# Patient Record
Sex: FEMALE | HISPANIC OR LATINO | Employment: FULL TIME | URBAN - METROPOLITAN AREA
[De-identification: names, ages, dates, MRNs, and addresses within clinical notes are randomized per-mention and may not be internally consistent; named-entity substitution may affect disease eponyms.]

---

## 2018-02-15 ENCOUNTER — HOSPITAL ENCOUNTER (OUTPATIENT)
Dept: RADIOLOGY | Facility: MEDICAL CENTER | Age: 58
End: 2018-02-15
Attending: FAMILY MEDICINE
Payer: COMMERCIAL

## 2018-02-15 DIAGNOSIS — R10.31 ABDOMINAL PAIN, RIGHT LOWER QUADRANT: ICD-10-CM

## 2018-02-15 PROCEDURE — 76830 TRANSVAGINAL US NON-OB: CPT

## 2018-02-15 PROCEDURE — 76700 US EXAM ABDOM COMPLETE: CPT

## 2018-02-20 ENCOUNTER — HOSPITAL ENCOUNTER (OUTPATIENT)
Dept: HOSPITAL 8 - CFH | Age: 58
Discharge: HOME | End: 2018-02-20
Attending: FAMILY MEDICINE
Payer: COMMERCIAL

## 2018-02-20 DIAGNOSIS — Z12.31: Primary | ICD-10-CM

## 2018-02-20 PROCEDURE — 77063 BREAST TOMOSYNTHESIS BI: CPT

## 2020-10-24 ENCOUNTER — OFFICE VISIT (OUTPATIENT)
Dept: URGENT CARE | Facility: PHYSICIAN GROUP | Age: 60
End: 2020-10-24
Payer: COMMERCIAL

## 2020-10-24 ENCOUNTER — HOSPITAL ENCOUNTER (OUTPATIENT)
Facility: MEDICAL CENTER | Age: 60
End: 2020-10-24
Attending: PHYSICIAN ASSISTANT
Payer: COMMERCIAL

## 2020-10-24 VITALS
OXYGEN SATURATION: 99 % | BODY MASS INDEX: 25.83 KG/M2 | DIASTOLIC BLOOD PRESSURE: 80 MMHG | HEIGHT: 65 IN | WEIGHT: 155 LBS | SYSTOLIC BLOOD PRESSURE: 132 MMHG | TEMPERATURE: 97.6 F | HEART RATE: 75 BPM | RESPIRATION RATE: 14 BRPM

## 2020-10-24 DIAGNOSIS — R05.9 COUGH: ICD-10-CM

## 2020-10-24 DIAGNOSIS — Z20.822 EXPOSURE TO COVID-19 VIRUS: ICD-10-CM

## 2020-10-24 PROCEDURE — U0003 INFECTIOUS AGENT DETECTION BY NUCLEIC ACID (DNA OR RNA); SEVERE ACUTE RESPIRATORY SYNDROME CORONAVIRUS 2 (SARS-COV-2) (CORONAVIRUS DISEASE [COVID-19]), AMPLIFIED PROBE TECHNIQUE, MAKING USE OF HIGH THROUGHPUT TECHNOLOGIES AS DESCRIBED BY CMS-2020-01-R: HCPCS

## 2020-10-24 PROCEDURE — 99203 OFFICE O/P NEW LOW 30 MIN: CPT | Mod: CS | Performed by: PHYSICIAN ASSISTANT

## 2020-10-24 RX ORDER — ALBUTEROL SULFATE 90 UG/1
2 AEROSOL, METERED RESPIRATORY (INHALATION) EVERY 6 HOURS PRN
Qty: 8.5 G | Refills: 0 | Status: SHIPPED | OUTPATIENT
Start: 2020-10-24 | End: 2024-01-29

## 2020-10-24 SDOH — HEALTH STABILITY: MENTAL HEALTH: HOW OFTEN DO YOU HAVE A DRINK CONTAINING ALCOHOL?: NEVER

## 2020-10-24 ASSESSMENT — ENCOUNTER SYMPTOMS
SHORTNESS OF BREATH: 1
WHEEZING: 0
GASTROINTESTINAL NEGATIVE: 1
SINUS PAIN: 0
RHINORRHEA: 0
SORE THROAT: 0
DIZZINESS: 0
COUGH: 1
SPUTUM PRODUCTION: 0
MYALGIAS: 1
PALPITATIONS: 0
HEADACHES: 1
CHILLS: 1
FEVER: 1
CARDIOVASCULAR NEGATIVE: 1

## 2020-10-24 NOTE — PROGRESS NOTES
Subjective:      Amber Gamble is a 60 y.o. female who presents with Headache (x2 days. bodyaches, headache,  is positive for covid )            Cough, congestion, body aches.  Has been tested positive for Covid 2 days ago.  She denies chest pain, shortness of breath, wheezing, leg swelling.  No history of asthma, pneumonia, DVT/PE.  Loss of taste and smell noted.    Cough  This is a new problem. The current episode started in the past 7 days. The problem has been gradually worsening. The problem occurs every few minutes. The cough is productive of sputum. Associated symptoms include chills, ear pain, a fever, headaches, myalgias, nasal congestion and shortness of breath. Pertinent negatives include no chest pain, rhinorrhea, sore throat or wheezing. Treatments tried: Tylenol. The treatment provided mild relief. There is no history of asthma, bronchitis or pneumonia.       PMH:  has no past medical history on file.  MEDS: No current outpatient medications on file.  ALLERGIES: Not on File  SURGHX: No past surgical history on file.  SOCHX:  reports that she has never smoked. She has never used smokeless tobacco. She reports that she does not drink alcohol.  FH: family history is not on file.    Review of Systems   Constitutional: Positive for chills, fever and malaise/fatigue.   HENT: Positive for congestion and ear pain. Negative for rhinorrhea, sinus pain and sore throat.    Respiratory: Positive for cough and shortness of breath. Negative for sputum production and wheezing.    Cardiovascular: Negative.  Negative for chest pain, palpitations and leg swelling.   Gastrointestinal: Negative.    Genitourinary: Negative.    Musculoskeletal: Positive for myalgias. Negative for joint pain.   Neurological: Positive for headaches. Negative for dizziness.   All other systems reviewed and are negative.      Medications, Allergies, and current problem list reviewed today in Epic  Family history reviewed with patient and  "is not pertinent for today's visit     Objective:     /80   Pulse 75   Temp 36.4 °C (97.6 °F) (Temporal)   Resp 14   Ht 1.64 m (5' 4.57\")   Wt 70.3 kg (155 lb)   SpO2 99%   BMI 26.14 kg/m²      Physical Exam  Vitals signs and nursing note reviewed.   Constitutional:       General: She is not in acute distress.     Appearance: She is well-developed. She is not ill-appearing, toxic-appearing or diaphoretic.   HENT:      Head: Normocephalic and atraumatic.      Right Ear: Tympanic membrane, ear canal and external ear normal.      Left Ear: Tympanic membrane, ear canal and external ear normal.      Nose: Congestion present. No rhinorrhea.      Mouth/Throat:      Pharynx: No oropharyngeal exudate or posterior oropharyngeal erythema.   Eyes:      General:         Right eye: No discharge.         Left eye: No discharge.      Conjunctiva/sclera: Conjunctivae normal.   Neck:      Musculoskeletal: Normal range of motion and neck supple.   Cardiovascular:      Rate and Rhythm: Normal rate and regular rhythm.      Heart sounds: Normal heart sounds.   Pulmonary:      Effort: Pulmonary effort is normal. No respiratory distress.      Breath sounds: Normal breath sounds. No wheezing, rhonchi or rales.   Abdominal:      General: Abdomen is flat. There is no distension.      Tenderness: There is no abdominal tenderness. There is no right CVA tenderness, guarding or rebound.   Musculoskeletal: Normal range of motion.         General: No swelling or tenderness.      Right lower leg: No edema.      Left lower leg: No edema.   Lymphadenopathy:      Cervical: No cervical adenopathy.   Skin:     General: Skin is warm and dry.   Neurological:      Mental Status: She is alert and oriented to person, place, and time.   Psychiatric:         Behavior: Behavior normal.         Thought Content: Thought content normal.         Judgment: Judgment normal.                 Assessment/Plan:         1. Exposure to COVID-19 virus  albuterol " 108 (90 Base) MCG/ACT Aero Soln inhalation aerosol    COVID/SARS COV-2 PCR   2. Cough  albuterol 108 (90 Base) MCG/ACT Aero Soln inhalation aerosol    COVID/SARS COV-2 PCR     Covid type symptoms with positive exposure 2 days ago.  PO2 adequate at 99% vital signs normal.  No wheezes rhonchi or rales heard on exam.  No chest pain, palpitations, shortness of breath or lower leg swelling/tenderness seen on exam.  No history of asthma, pneumonia, DVT/PE.  Albuterol for breakthrough shortness of breath.  Covid testing initiated.  Quarantine per CDC guidelines.  Note for work provided.  OTC meds and conservative measures as discussed    Return to clinic or go to ED if symptoms worsen or persist. Indications for ED discussed at length. Patient voices understanding. Follow-up with your primary care provider in 3-5 days. Red flags discussed. All side effects of medication discussed including allergic response, GI upset, tendon injury, etc.    Please note that this dictation was created using voice recognition software. I have made every reasonable attempt to correct obvious errors, but I expect that there are errors of grammar and possibly content that I did not discover before finalizing the note.

## 2020-10-24 NOTE — LETTER
October 24, 2020      Amber Gamble  [address] [facility] [place] [address]    To Whom it May Concern,       Your employee was seen in our clinic today.  A concern for COVID-19 has been identified and testing is in progress.?       We are asking you to excuse absences while following self-isolation protocol per Center for Disease Control (CDC) guidelines.  Your employee will be able to access test results through our electronic delivery system called [employer].?       If the results of testing are negative, and once there has been no fever (temperature >100.4 F) for at least 72 hours without treatment, and no vomiting or diarrhea for at least 48 hours, then return to work is approved.       If the results of testing are positive then your employee will be contacted by the [facility] or [facility] department for further instructions on duration of self-isolation and return to work protocol. In general, this will also follow the CDC guidelines with a minimum of 10 days from the onset of symptoms and without fever, vomiting, or diarrhea as above.?       In general, repeat testing is not necessary and not offered through our [facility] care.?       This is the only note that will be provided from [facility] for this visit.  Your employee will require an appointment with a primary care provider if FMLA or disability forms are required.     Sincerely,?        Kevin Jules P.A.-C.    Electronically Signed

## 2020-10-25 DIAGNOSIS — Z20.822 EXPOSURE TO COVID-19 VIRUS: ICD-10-CM

## 2020-10-25 DIAGNOSIS — R05.9 COUGH: ICD-10-CM

## 2020-10-25 LAB
COVID ORDER STATUS COVID19: NORMAL
SARS-COV-2 RNA RESP QL NAA+PROBE: NOTDETECTED
SPECIMEN SOURCE: NORMAL

## 2020-10-28 ENCOUNTER — OFFICE VISIT (OUTPATIENT)
Dept: URGENT CARE | Facility: PHYSICIAN GROUP | Age: 60
End: 2020-10-28
Payer: COMMERCIAL

## 2020-10-28 ENCOUNTER — HOSPITAL ENCOUNTER (OUTPATIENT)
Facility: MEDICAL CENTER | Age: 60
End: 2020-10-28
Attending: NURSE PRACTITIONER
Payer: COMMERCIAL

## 2020-10-28 VITALS
RESPIRATION RATE: 14 BRPM | SYSTOLIC BLOOD PRESSURE: 132 MMHG | HEART RATE: 95 BPM | TEMPERATURE: 98.6 F | BODY MASS INDEX: 26.29 KG/M2 | DIASTOLIC BLOOD PRESSURE: 70 MMHG | OXYGEN SATURATION: 98 % | WEIGHT: 154 LBS | HEIGHT: 64 IN

## 2020-10-28 DIAGNOSIS — R05.9 COUGH: ICD-10-CM

## 2020-10-28 DIAGNOSIS — Z20.822 EXPOSURE TO COVID-19 VIRUS: ICD-10-CM

## 2020-10-28 DIAGNOSIS — R68.83 CHILLS: ICD-10-CM

## 2020-10-28 DIAGNOSIS — R52 GENERALIZED BODY ACHES: ICD-10-CM

## 2020-10-28 DIAGNOSIS — B34.9 VIRAL ILLNESS: ICD-10-CM

## 2020-10-28 PROCEDURE — U0003 INFECTIOUS AGENT DETECTION BY NUCLEIC ACID (DNA OR RNA); SEVERE ACUTE RESPIRATORY SYNDROME CORONAVIRUS 2 (SARS-COV-2) (CORONAVIRUS DISEASE [COVID-19]), AMPLIFIED PROBE TECHNIQUE, MAKING USE OF HIGH THROUGHPUT TECHNOLOGIES AS DESCRIBED BY CMS-2020-01-R: HCPCS

## 2020-10-28 PROCEDURE — 99214 OFFICE O/P EST MOD 30 MIN: CPT | Mod: CS | Performed by: NURSE PRACTITIONER

## 2020-10-28 ASSESSMENT — ENCOUNTER SYMPTOMS
CHILLS: 1
SORE THROAT: 1
VOMITING: 1
MYALGIAS: 1
CARDIOVASCULAR NEGATIVE: 1
NAUSEA: 1
HEADACHES: 1
COUGH: 1
DIARRHEA: 1

## 2020-10-28 ASSESSMENT — VISUAL ACUITY: OU: 1

## 2020-10-28 NOTE — PROGRESS NOTES
"Subjective:     Amber Gamble is a 60 y.o. female who presents for Headache (x4 days, headache, nausea, diarrhea, emesis, chills, very cold, deep breath mild pain in throat and chest. bodyaches )    Kazakh translation provided by professional video conferencing service.        Headache   Associated symptoms include coughing, nausea, a sore throat and vomiting.     Patient complaining of persistent symptoms. Seen 4 days ago in . Tested negative for COVID-19. Patient concerned that she has it as her  tested positive.    Patient was screened prior to rooming and denied COVID-19 diagnosis or contact with a person who has been diagnosed or is suspected to have COVID-19. During this visit, appropriate PPE was worn, hand hygiene was performed, and the patient and any visitors were masked.     PMH:  has no past medical history on file.    MEDS:   Current Outpatient Medications:   •  Pseudoephedrine-Ibuprofen (ADVIL COLD/SINUS PO), Take  by mouth., Disp: , Rfl:   •  albuterol 108 (90 Base) MCG/ACT Aero Soln inhalation aerosol, Inhale 2 Puffs by mouth every 6 hours as needed for Shortness of Breath. (Patient not taking: Reported on 10/28/2020), Disp: 8.5 g, Rfl: 0    ALLERGIES: Not on File    SURGHX: No past surgical history on file.    SOCHX:  reports that she has never smoked. She has never used smokeless tobacco. She reports that she does not drink alcohol.     FH: Reviewed with patient, not pertinent to this visit.    Review of Systems   Constitutional: Positive for chills.   HENT: Positive for sore throat.    Respiratory: Positive for cough.    Cardiovascular: Negative.    Gastrointestinal: Positive for diarrhea, nausea and vomiting.   Musculoskeletal: Positive for myalgias.   Neurological: Positive for headaches.   All other systems reviewed and are negative.    Additional details per HPI.      Objective:     /70   Pulse 95   Temp 37 °C (98.6 °F) (Temporal)   Resp 14   Ht 1.626 m (5' 4\")   Wt 69.9 " kg (154 lb)   SpO2 98%   BMI 26.43 kg/m²     Physical Exam  Vitals signs reviewed.   Constitutional:       General: She is not in acute distress.     Appearance: She is well-developed. She is not ill-appearing or toxic-appearing.   HENT:      Head: Normocephalic.      Right Ear: External ear normal.      Left Ear: External ear normal.      Nose: Nose normal.      Mouth/Throat:      Mouth: Mucous membranes are moist.      Pharynx: Oropharynx is clear. Uvula midline.   Eyes:      General: Vision grossly intact.      Extraocular Movements: Extraocular movements intact.      Conjunctiva/sclera: Conjunctivae normal.      Pupils: Pupils are equal, round, and reactive to light.   Neck:      Musculoskeletal: Normal range of motion.   Cardiovascular:      Rate and Rhythm: Normal rate and regular rhythm.      Heart sounds: Normal heart sounds.   Pulmonary:      Effort: Pulmonary effort is normal. No respiratory distress.      Breath sounds: Normal breath sounds. No decreased breath sounds, wheezing, rhonchi or rales.   Abdominal:      General: Bowel sounds are normal.      Palpations: Abdomen is soft.   Musculoskeletal: Normal range of motion.         General: No deformity.   Skin:     General: Skin is warm and dry.      Coloration: Skin is not pale.   Neurological:      Mental Status: She is alert and oriented to person, place, and time.      Sensory: No sensory deficit.      Motor: No weakness.      Coordination: Coordination normal.   Psychiatric:         Behavior: Behavior normal. Behavior is cooperative.       Assessment/Plan:     1. Exposure to COVID-19 virus    2. Chills    3. Generalized body aches    4. Cough    5. Viral illness  - COVID/SARS CoV-2 PCR; Future    Discussed likely self-limiting viral etiology and expected course and duration of illness. Vital signs stable, afebrile, no acute distress at this time.     COVID test pending. The CDC recommends that any person who has symptoms of COVID-19 self-isolates  until 1) at least 10 days have elapsed since symptom onset, and 2) at least 24 hours have passed since resolution of any fever without use of fever-reducing medications, and 3) other symptoms have improved. According to the CDC, the patient may leave self-isolation once all of these criteria have been met.    Differential diagnosis, natural history, supportive care, rest, fluids, over-the-counter symptom management per 's instructions, APAP, close monitoring, and indications for immediate follow-up discussed.     Warning signs reviewed. Return precautions discussed.     Patient advised to: Return for 1) Symptoms that worsen/don't improve, or go to ER, 2) Follow up with primary care in 7-10 days.    All questions answered. Patient agrees with the plan of care.    Discharge summary provided.     Billing note for MDM: at least a moderate risk due to 1) undiagnosed new problem, and problem points due to 2) new, further work up planned (COVID-19 test pending to further evaluate viral illness).

## 2020-10-28 NOTE — PATIENT INSTRUCTIONS
COVID test pending. The CDC recommends that any person who has symptoms of COVID-19 self-isolates until 1) at least 10 days have elapsed since symptom onset, and 2) at least 24 hours have passed since resolution of any fever without use of fever-reducing medications, and 3) other symptoms have improved. According to the CDC, the patient may leave self-isolation once all of these criteria have been met.       Enfermedades virales en los adultos  (Viral Illness, Adult)  Los virus son microbios diminutos que entran en el organismo de jake persona y causan enfermedades. Hay muchos tipos de virus diferentes y causan muchas clases de enfermedades. Las enfermedades virales pueden ser leves o graves. Pueden afectar diferentes partes del cuerpo.  Las enfermedades frecuentes causadas por virus incluyen los resfríos y la gripe. Además, las enfermedades virales abarcan fritz clínicos graves, federico el VIH/sida (virus de inmunodeficiencia humana/síndrome de inmunodeficiencia adquirida). Se hilliard identificado unos pocos virus asociados con determinados tipos de cáncer.  ¿CUÁLES SON LAS CAUSAS?  Muchos tipos de virus pueden causar enfermedades. Los virus invaden las células del organismo, se multiplican y provocan la disfunción o la muerte de las células infectadas. Cuando la célula muere, libera más virus. Cuando esto ocurre, aparecen síntomas de la enfermedad, y el virus sigue diseminándose a otras células. Si el virus asume la función de la célula, puede hacer que esta se divida y crezca fuera de control, y vasile es el kitty en el que un virus causa cáncer.  Los diferentes virus ingresan al organismo de distintas formas. Puede contraer un virus de la siguiente manera:  · Al ingerir alimentos o beber agua contaminados con el virus.  · Al inhalar gotitas que jake persona infectada liberó en el aire al toser o estornudar.  · Al tocar jake superficie contaminada con el virus y luego llevarse la mano a la boca, la nariz o los ojos.  · Al  ser ty por un insecto o mordido por un animal que son portadores del virus.  · Al tener contacto sexual con jake persona infectada por el virus.  · Al tener contacto con desire o líquidos que contienen el virus, ya sea a través de un laney abierto o zulema jake transfusión.  Si el virus ingresa al organismo, el sistema de defensa del cuerpo (sistema inmunitario) intentará combatirlo. Puede correr un riesgo más alto de tener jake enfermedad viral si tiene el sistema inmunitario debilitado.  ¿CUÁLES SON LOS SIGNOS O LOS SÍNTOMAS?  Los síntomas varían en función del tipo de virus y de la ubicación de las células que vasile invade. Los síntomas frecuentes de los principales tipos de enfermedades virales incluyen los siguientes:  Virus del resfrío y de la gripe  · Fiebre.  · Dolor de jay.  · Dolor de garganta.  · Nimo musculares.  · Congestión nasal.  · Tos.  Virus del aparato digestivo (gastrointestinales)  · Fiebre.  · Dolor abdominal.  · Náuseas.  · Diarrea.  Virus hepáticos (hepatitis)  · Pérdida del apetito.  · Cansancio.  · Tank amarillento de la piel (ictericia).  Virus del cerebro y la médula bah  · Fiebre.  · Dolor de jay.  · Rigidez en el rosita.  · Náuseas y vómitos.  · Confusión o somnolencia.  Virus de la piel  · Verrugas.  · Picazón.  · Erupción cutánea.  Virus de transmisión sexual  · Secreción.  · Hinchazón.  · Enrojecimiento.  · Erupción cutánea.  ¿CÓMO SE TRATA ESTA AFECCIÓN?  Los virus pueden ser difíciles de tratar porque se hospedan en las células. Los antibióticos no tratan los virus porque no llegan al interior de las células. El tratamiento de jake enfermedad viral puede incluir lo siguiente:  · Descansar y beber abundantes líquidos.  · Medicamentos para aliviar los síntomas. Estos pueden incluir medicamentos de venta cricket para el dolor y la fiebre, medicamentos para la tos o la congestión y medicamentos para aliviar la diarrea.  · Medicamentos antivirales. Estos fármacos están  disponibles únicamente para determinados tipos de virus. Pueden ayudar a aliviar los síntomas de la gripe, si se nedra apenas comienza el cuadro. También hay antivirales para la hepatitis y el VIH/sida.  Algunas enfermedades virales pueden evitarse con vacunas. Un ejemplo frecuente es la vacuna antigripal.  SIGA ESTAS INDICACIONES EN CARDOZA CASA:  Medicamentos  · Whitten los medicamentos de venta cricket y los recetados solamente federico se lo haya indicado el médico.  · Si le recetaron un antiviral, tómelo federico se lo haya indicado el médico. No deje de aditya los medicamentos aunque comience a sentirse mejor.  · Infórmese sobre cuándo los antibióticos son necesarios y cuándo no. Los antibióticos no combaten a los virus. Si el médico leesa que usted puede tener jake infección bacteriana así federico jake viral, caro vez le receten un antibiótico.  ? No solicite jake receta de antibióticos si le hilliard diagnosticado jake enfermedad viral. Eso no hará que la enfermedad pase más rápidamente.  ? Aditya antibióticos con frecuencia cuando no son necesarios puede derivar en resistencia a los antibióticos. Cuando esto ocurre, el medicamento pierde cardoza eficacia contra las bacterias que normalmente combate.  Instrucciones generales  · Benita suficiente líquido para mantener la orina miguel o de color amarillo pálido.  · Descanse todo lo que pueda.  · Retome danni actividades normales federico se lo haya indicado el médico. Pregúntele al médico qué actividades son seguras para usted.  · Concurra a todas las visitas de control federico se lo haya indicado el médico. Odebolt es importante.  ¿CÓMO SE ROSI ESTO?  Whitten estas medidas para reducir el riesgo de tener jake infección viral:  · Consuma jake dieta rashid y descanse mucho.  · Lávese las lei frecuentemente con agua y jabón. Odebolt es especialmente importante cuando está en lugares públicos. Use desinfectante para lei si no dispone de agua y jabón.  · Evite el contacto cercano con amigos y familiares que tengan jake  infección viral.  · Si viaja a las regiones donde las infecciones virales gastrointestinales son frecuentes, no tome agua ni coma alimentos crudos.  · Manténgase al día con las vacunas. Vacúnese contra la gripe todos los años federico se lo haya indicado el médico.  · No comparta cepillos de dientes, cortaúñas, rasuradoras o agujas con otras personas.  · Siempre tenga sexo con protección.  COMUNÍQUESE CON UN MÉDICO SI:  · Tiene síntomas de jake enfermedad viral que no desaparecen.  · Los síntomas regresan después de branden desaparecido.  · Los síntomas empeoran.  SOLICITE AYUDA DE INMEDIATO SI:  · Tiene dificultad para respirar.  · Siente un dolor de jay intenso o rigidez en el rosita.  · Tiene vómitos emil o dolor abdominal.  Esta información no tiene federico fin reemplazar el consejo del médico. Asegúrese de hacerle al médico cualquier pregunta que tenga.  Document Released: 08/24/2017 Document Revised: 08/24/2017 Document Reviewed: 04/28/2017  Elsevier Patient Education © 2020 [employer]

## 2020-10-29 DIAGNOSIS — B34.9 VIRAL ILLNESS: ICD-10-CM

## 2020-10-29 LAB — COVID ORDER STATUS COVID19: NORMAL

## 2020-10-30 ENCOUNTER — TELEPHONE (OUTPATIENT)
Dept: URGENT CARE | Facility: PHYSICIAN GROUP | Age: 60
End: 2020-10-30

## 2020-10-30 LAB
SARS-COV-2 RNA RESP QL NAA+PROBE: DETECTED
SPECIMEN SOURCE: ABNORMAL

## 2020-10-30 NOTE — TELEPHONE ENCOUNTER
Spoke with patient over the phone regarding her SARS-CoV-2 PCR test results which came back positive.  Patient advised that she has COVID-19.    Patient advised that the health department will be in contact with her.  Patient advised to continue with self-isolation, supportive measures, and close monitoring with return precautions.    As requested, a letter with her test results has been forwarded to the  and she may pick this up at her earliest convenience.    All questions answered.

## 2020-11-03 ENCOUNTER — HOSPITAL ENCOUNTER (EMERGENCY)
Facility: MEDICAL CENTER | Age: 60
End: 2020-11-03
Attending: EMERGENCY MEDICINE
Payer: COMMERCIAL

## 2020-11-03 ENCOUNTER — APPOINTMENT (OUTPATIENT)
Dept: RADIOLOGY | Facility: MEDICAL CENTER | Age: 60
End: 2020-11-03
Attending: EMERGENCY MEDICINE
Payer: COMMERCIAL

## 2020-11-03 VITALS
WEIGHT: 155 LBS | BODY MASS INDEX: 26.46 KG/M2 | SYSTOLIC BLOOD PRESSURE: 122 MMHG | DIASTOLIC BLOOD PRESSURE: 74 MMHG | HEIGHT: 64 IN | OXYGEN SATURATION: 92 % | RESPIRATION RATE: 16 BRPM | HEART RATE: 95 BPM | TEMPERATURE: 99.9 F

## 2020-11-03 DIAGNOSIS — U07.1 COVID-19 VIRUS INFECTION: ICD-10-CM

## 2020-11-03 DIAGNOSIS — J12.9 VIRAL PNEUMONIA: ICD-10-CM

## 2020-11-03 LAB
ALBUMIN SERPL BCP-MCNC: 3.7 G/DL (ref 3.2–4.9)
ALBUMIN/GLOB SERPL: 1.2 G/DL
ALP SERPL-CCNC: 83 U/L (ref 30–99)
ALT SERPL-CCNC: 54 U/L (ref 2–50)
ANION GAP SERPL CALC-SCNC: 7 MMOL/L (ref 7–16)
AST SERPL-CCNC: 44 U/L (ref 12–45)
BASOPHILS # BLD AUTO: 0.2 % (ref 0–1.8)
BASOPHILS # BLD: 0.01 K/UL (ref 0–0.12)
BILIRUB SERPL-MCNC: 0.4 MG/DL (ref 0.1–1.5)
BUN SERPL-MCNC: 10 MG/DL (ref 8–22)
CALCIUM SERPL-MCNC: 8.7 MG/DL (ref 8.5–10.5)
CHLORIDE SERPL-SCNC: 96 MMOL/L (ref 96–112)
CO2 SERPL-SCNC: 27 MMOL/L (ref 20–33)
CREAT SERPL-MCNC: 0.59 MG/DL (ref 0.5–1.4)
EKG IMPRESSION: NORMAL
EOSINOPHIL # BLD AUTO: 0 K/UL (ref 0–0.51)
EOSINOPHIL NFR BLD: 0 % (ref 0–6.9)
ERYTHROCYTE [DISTWIDTH] IN BLOOD BY AUTOMATED COUNT: 41.1 FL (ref 35.9–50)
GLOBULIN SER CALC-MCNC: 3.2 G/DL (ref 1.9–3.5)
GLUCOSE SERPL-MCNC: 158 MG/DL (ref 65–99)
HCT VFR BLD AUTO: 42.2 % (ref 37–47)
HGB BLD-MCNC: 14.2 G/DL (ref 12–16)
IMM GRANULOCYTES # BLD AUTO: 0.02 K/UL (ref 0–0.11)
IMM GRANULOCYTES NFR BLD AUTO: 0.3 % (ref 0–0.9)
LYMPHOCYTES # BLD AUTO: 0.87 K/UL (ref 1–4.8)
LYMPHOCYTES NFR BLD: 13.1 % (ref 22–41)
MCH RBC QN AUTO: 30.1 PG (ref 27–33)
MCHC RBC AUTO-ENTMCNC: 33.6 G/DL (ref 33.6–35)
MCV RBC AUTO: 89.6 FL (ref 81.4–97.8)
MONOCYTES # BLD AUTO: 0.31 K/UL (ref 0–0.85)
MONOCYTES NFR BLD AUTO: 4.7 % (ref 0–13.4)
NEUTROPHILS # BLD AUTO: 5.43 K/UL (ref 2–7.15)
NEUTROPHILS NFR BLD: 81.7 % (ref 44–72)
NRBC # BLD AUTO: 0 K/UL
NRBC BLD-RTO: 0 /100 WBC
PLATELET # BLD AUTO: 164 K/UL (ref 164–446)
PMV BLD AUTO: 10.3 FL (ref 9–12.9)
POTASSIUM SERPL-SCNC: 3.3 MMOL/L (ref 3.6–5.5)
PROT SERPL-MCNC: 6.9 G/DL (ref 6–8.2)
RBC # BLD AUTO: 4.71 M/UL (ref 4.2–5.4)
SODIUM SERPL-SCNC: 130 MMOL/L (ref 135–145)
TROPONIN T SERPL-MCNC: <6 NG/L (ref 6–19)
WBC # BLD AUTO: 6.6 K/UL (ref 4.8–10.8)

## 2020-11-03 PROCEDURE — 93005 ELECTROCARDIOGRAM TRACING: CPT | Performed by: EMERGENCY MEDICINE

## 2020-11-03 PROCEDURE — 71045 X-RAY EXAM CHEST 1 VIEW: CPT

## 2020-11-03 PROCEDURE — A9270 NON-COVERED ITEM OR SERVICE: HCPCS | Performed by: EMERGENCY MEDICINE

## 2020-11-03 PROCEDURE — 80053 COMPREHEN METABOLIC PANEL: CPT

## 2020-11-03 PROCEDURE — 85025 COMPLETE CBC W/AUTO DIFF WBC: CPT

## 2020-11-03 PROCEDURE — 700111 HCHG RX REV CODE 636 W/ 250 OVERRIDE (IP): Performed by: EMERGENCY MEDICINE

## 2020-11-03 PROCEDURE — 36415 COLL VENOUS BLD VENIPUNCTURE: CPT

## 2020-11-03 PROCEDURE — 96372 THER/PROPH/DIAG INJ SC/IM: CPT

## 2020-11-03 PROCEDURE — 700102 HCHG RX REV CODE 250 W/ 637 OVERRIDE(OP): Performed by: EMERGENCY MEDICINE

## 2020-11-03 PROCEDURE — 84484 ASSAY OF TROPONIN QUANT: CPT

## 2020-11-03 PROCEDURE — 99284 EMERGENCY DEPT VISIT MOD MDM: CPT

## 2020-11-03 RX ORDER — AMOXICILLIN AND CLAVULANATE POTASSIUM 875; 125 MG/1; MG/1
1 TABLET, FILM COATED ORAL 2 TIMES DAILY
Qty: 14 TAB | Refills: 0 | Status: ON HOLD | OUTPATIENT
Start: 2020-11-03 | End: 2020-11-08

## 2020-11-03 RX ORDER — DOXYCYCLINE 100 MG/1
100 TABLET ORAL ONCE
Status: COMPLETED | OUTPATIENT
Start: 2020-11-03 | End: 2020-11-03

## 2020-11-03 RX ORDER — AMOXICILLIN AND CLAVULANATE POTASSIUM 875; 125 MG/1; MG/1
1 TABLET, FILM COATED ORAL ONCE
Status: COMPLETED | OUTPATIENT
Start: 2020-11-03 | End: 2020-11-03

## 2020-11-03 RX ORDER — KETOROLAC TROMETHAMINE 30 MG/ML
15 INJECTION, SOLUTION INTRAMUSCULAR; INTRAVENOUS ONCE
Status: COMPLETED | OUTPATIENT
Start: 2020-11-03 | End: 2020-11-03

## 2020-11-03 RX ORDER — DOXYCYCLINE 100 MG/1
100 CAPSULE ORAL 2 TIMES DAILY
Qty: 20 CAP | Refills: 0 | Status: ON HOLD | OUTPATIENT
Start: 2020-11-03 | End: 2020-11-08

## 2020-11-03 RX ADMIN — KETOROLAC TROMETHAMINE 15 MG: 30 INJECTION, SOLUTION INTRAMUSCULAR; INTRAVENOUS at 14:03

## 2020-11-03 RX ADMIN — AMOXICILLIN AND CLAVULANATE POTASSIUM 1 TABLET: 875; 125 TABLET, FILM COATED ORAL at 15:19

## 2020-11-03 RX ADMIN — DOXYCYCLINE 100 MG: 100 TABLET, FILM COATED ORAL at 15:19

## 2020-11-03 ASSESSMENT — PAIN DESCRIPTION - PAIN TYPE: TYPE: ACUTE PAIN

## 2020-11-03 NOTE — ED NOTES
Pt discharged from ED using . Verbalized understanding of instructions. No questions or concerns at this time. Pt walked out of department with steady gait. Respirations even and unlabored.

## 2020-11-03 NOTE — ED TRIAGE NOTES
Amber Mcdermott  60 y.o.  Chief Complaint   Patient presents with   • Chest Pressure     X2 days   • Fever     at nights at home     Patient to triage from lobby with above complaint.  Pt know + COVID on 10/28, increased chest pressure over past couple of days. [facility]    Vitals:    11/03/20 1052   BP: 121/70   Pulse: (!) 103   Resp: 15   Temp: 37.7 °C (99.9 °F)   SpO2: 90%       Triage process explained to patient, apologized for wait time Pt informed to notify staff of any change in condition. NAD at this time.

## 2020-11-03 NOTE — DISCHARGE INSTRUCTIONS
Please return to the emergency department if you have severe shortness of breath, chest pain.  I know you have a pulse oximeter at home, utilize that if her saturation oxygen's are consistently in the 80 percentile range you may need further evaluation and management.  Your Covid positive, you will need to wait least 10 days from symptom onset and at least 24 hours without a fever that utilizing time ibuprofen before you can go back to general public.

## 2020-11-04 NOTE — ED PROVIDER NOTES
ED Provider Note    CHIEF COMPLAINT  Chief Complaint   Patient presents with   • Chest Pressure     X2 days   • Fever     at nights at home       HPI  Amber House is a 60 y.o. female who presents with increasing chest pressure, fever, shortness of breath.  The patient was diagnosed with Covid on the 28th of October and since then she has had slight increasing shortness of breath.  She states that she has had slight chest discomfort with deep breaths only, does not have pain in her chest that she is had taking deep breaths.  No swelling of her lower extremities, nausea, vomiting.  Complains of fever and body aches as well.      REVIEW OF SYSTEMS  Pertinent positives include slight shortness of breath, chest pain with deep inspiration  Pertinent negatives include lightheadedness, dizziness, nausea, vomiting  Other 10 review of systems are negative  PAST MEDICAL HISTORY  History reviewed. No pertinent past medical history.    FAMILY HISTORY  History reviewed. No pertinent family history.    SOCIAL HISTORY  Social History     Socioeconomic History   • Marital status:      Spouse name: Not on file   • Number of children: Not on file   • Years of education: Not on file   • Highest education level: Not on file   Occupational History   • Not on file   Social Needs   • Financial resource strain: Not on file   • Food insecurity     Worry: Not on file     Inability: Not on file   • Transportation needs     Medical: Not on file     Non-medical: Not on file   Tobacco Use   • Smoking status: Never Smoker   • Smokeless tobacco: Never Used   Substance and Sexual Activity   • Alcohol use: Never     Frequency: Never   • Drug use: Never   • Sexual activity: Not on file   Lifestyle   • Physical activity     Days per week: Not on file     Minutes per session: Not on file   • Stress: Not on file   Relationships   • Social connections     Talks on phone: Not on file     Gets together: Not on file     Attends Yarsani  "service: Not on file     Active member of club or organization: Not on file     Attends meetings of clubs or organizations: Not on file     Relationship status: Not on file   • Intimate partner violence     Fear of current or ex partner: Not on file     Emotionally abused: Not on file     Physically abused: Not on file     Forced sexual activity: Not on file   Other Topics Concern   • Not on file   Social History Narrative   • Not on file       SURGICAL HISTORY  History reviewed. No pertinent surgical history.    CURRENT MEDICATIONS  Home Medications    **Home medications have not yet been reviewed for this encounter**         ALLERGIES  No Known Allergies    PHYSICAL EXAM  VITAL SIGNS: /74   Pulse 95   Temp 37.7 °C (99.9 °F) (Temporal)   Resp 16   Ht 1.626 m (5' 4\")   Wt 70.3 kg (155 lb)   SpO2 92%   BMI 26.61 kg/m²      Constitutional: Well developed, Well nourished, No acute distress, Non-toxic appearance.   HENT: Normocephalic, Atraumatic, Bilateral external ears normal, Oropharynx moist mucous membranes, No oral exudates, no rhinorrhea.   Eyes: PERRLA, EOMI, Conjunctiva normal, No discharge.   Cardiovascular: Normal heart rate, Normal rhythm, No murmurs, No rubs, No gallops.   Thorax & Lungs:  No respiratory distress, slight rales or rhonchi bilaterally no wheezing, No chest tenderness.   Abdomen: Bowel sounds normal, Soft, No tenderness, No guarding, No rebound, No masses, No pulsatile masses.   Skin: Warm, Dry, No erythema, No rash.   Back: No midline thoracic and lumbar spine tenderness, No CVA tenderness.   Extremities:  No edema,no obvious deformity, no evidence of trauma  Neurologic: Alert & oriented x 3, No focal deficits noted, normal gait.      RADIOLOGY/PROCEDURES  DX-CHEST-PORTABLE (1 VIEW)   Final Result      Bilateral airspace opacities, left greater than right worrisome for multifocal pneumonia such as Covid 19.               COURSE & MEDICAL DECISION MAKING  Pertinent Labs & Imaging " studies reviewed. (See chart for details)  This is a pleasant 60-year-old female presents with probable viral pneumonia although could have community-acquired pneumonia.  The patient saturation oxygen was consistently 90-93 occasionally 9495 percentile my reevaluation's.  The patient speaking in full sentences, she is ambulating through the department out difficulty and she has no evidence of any increased work of breathing.  The patient received Augmentin and doxycycline here for possible commune acquired pneumonia although is probably viral in etiology.  The patient received prescriptions as below.  Encouraged her to monitor her saturation of oxygen and becomes an 80 percentile to return to the emergency part for reevaluation.  The patient understood, she was speaking in full sentences, no evidence of increased work of breathing, no evidence of impending respiratory distress or respiratory failure.      FINAL IMPRESSION     1. Viral pneumonia Active   2. COVID-19 virus infection Active         DISPOSITION:  Patient will be discharged home in stable condition.    FOLLOW UP:  [facility], Emergency Dept  [address]  [place] [id number] [phone]    If symptoms worsen    Robbie Hooker M.D.  [address] [place] [address] [address] [place] [address]  [phone]    Schedule an appointment as soon as possible for a visit in 3 days  As needed      OUTPATIENT MEDICATIONS:  Discharge Medication List as of 11/3/2020  3:13 PM      START taking these medications    Details   amoxicillin-clavulanate (AUGMENTIN) 875-125 MG Tab Take 1 Tab by mouth 2 times a day for 7 days., Disp-14 Tab, R-0, Normal      doxycycline (MONODOX) 100 MG capsule Take 1 Cap by mouth 2 times a day for 10 days., Disp-20 Cap, R-0, Normal             Electronically signed by: James Ruggiero D.O., 11/3/2020 4:10 PM

## 2020-11-05 ENCOUNTER — APPOINTMENT (OUTPATIENT)
Dept: RADIOLOGY | Facility: MEDICAL CENTER | Age: 60
DRG: 177 | End: 2020-11-05
Attending: EMERGENCY MEDICINE
Payer: COMMERCIAL

## 2020-11-05 ENCOUNTER — HOSPITAL ENCOUNTER (INPATIENT)
Facility: MEDICAL CENTER | Age: 60
LOS: 3 days | DRG: 177 | End: 2020-11-08
Attending: EMERGENCY MEDICINE | Admitting: INTERNAL MEDICINE
Payer: COMMERCIAL

## 2020-11-05 DIAGNOSIS — R09.02 HYPOXIA: ICD-10-CM

## 2020-11-05 DIAGNOSIS — U07.1 COVID-19: ICD-10-CM

## 2020-11-05 PROBLEM — J96.00 ACUTE RESPIRATORY FAILURE (HCC): Status: ACTIVE | Noted: 2020-11-05

## 2020-11-05 PROBLEM — E87.6 HYPOKALEMIA: Status: ACTIVE | Noted: 2020-11-05

## 2020-11-05 PROBLEM — J12.82 PNEUMONIA DUE TO COVID-19 VIRUS: Status: ACTIVE | Noted: 2020-11-05

## 2020-11-05 LAB
ALBUMIN SERPL BCP-MCNC: 4.2 G/DL (ref 3.2–4.9)
ALBUMIN/GLOB SERPL: 1.2 G/DL
ALP SERPL-CCNC: 95 U/L (ref 30–99)
ALT SERPL-CCNC: 97 U/L (ref 2–50)
ANION GAP SERPL CALC-SCNC: 14 MMOL/L (ref 7–16)
AST SERPL-CCNC: 61 U/L (ref 12–45)
BASOPHILS # BLD AUTO: 0.1 % (ref 0–1.8)
BASOPHILS # BLD: 0.01 K/UL (ref 0–0.12)
BILIRUB SERPL-MCNC: 0.4 MG/DL (ref 0.1–1.5)
BUN SERPL-MCNC: 15 MG/DL (ref 8–22)
CALCIUM SERPL-MCNC: 9.7 MG/DL (ref 8.5–10.5)
CHLORIDE SERPL-SCNC: 101 MMOL/L (ref 96–112)
CO2 SERPL-SCNC: 25 MMOL/L (ref 20–33)
CREAT SERPL-MCNC: 0.78 MG/DL (ref 0.5–1.4)
EOSINOPHIL # BLD AUTO: 0.01 K/UL (ref 0–0.51)
EOSINOPHIL NFR BLD: 0.1 % (ref 0–6.9)
ERYTHROCYTE [DISTWIDTH] IN BLOOD BY AUTOMATED COUNT: 41.5 FL (ref 35.9–50)
GLOBULIN SER CALC-MCNC: 3.6 G/DL (ref 1.9–3.5)
GLUCOSE SERPL-MCNC: 178 MG/DL (ref 65–99)
HCT VFR BLD AUTO: 44.9 % (ref 37–47)
HGB BLD-MCNC: 15 G/DL (ref 12–16)
IMM GRANULOCYTES # BLD AUTO: 0.06 K/UL (ref 0–0.11)
IMM GRANULOCYTES NFR BLD AUTO: 0.7 % (ref 0–0.9)
LYMPHOCYTES # BLD AUTO: 1.2 K/UL (ref 1–4.8)
LYMPHOCYTES NFR BLD: 13.8 % (ref 22–41)
MAGNESIUM SERPL-MCNC: 2.4 MG/DL (ref 1.5–2.5)
MCH RBC QN AUTO: 30.1 PG (ref 27–33)
MCHC RBC AUTO-ENTMCNC: 33.4 G/DL (ref 33.6–35)
MCV RBC AUTO: 90 FL (ref 81.4–97.8)
MONOCYTES # BLD AUTO: 0.6 K/UL (ref 0–0.85)
MONOCYTES NFR BLD AUTO: 6.9 % (ref 0–13.4)
NEUTROPHILS # BLD AUTO: 6.82 K/UL (ref 2–7.15)
NEUTROPHILS NFR BLD: 78.4 % (ref 44–72)
NRBC # BLD AUTO: 0 K/UL
NRBC BLD-RTO: 0 /100 WBC
NT-PROBNP SERPL IA-MCNC: 205 PG/ML (ref 0–125)
PLATELET # BLD AUTO: 309 K/UL (ref 164–446)
PMV BLD AUTO: 9.7 FL (ref 9–12.9)
POTASSIUM SERPL-SCNC: 4 MMOL/L (ref 3.6–5.5)
PROT SERPL-MCNC: 7.8 G/DL (ref 6–8.2)
RBC # BLD AUTO: 4.99 M/UL (ref 4.2–5.4)
SODIUM SERPL-SCNC: 140 MMOL/L (ref 135–145)
WBC # BLD AUTO: 8.7 K/UL (ref 4.8–10.8)

## 2020-11-05 PROCEDURE — 83735 ASSAY OF MAGNESIUM: CPT

## 2020-11-05 PROCEDURE — 99285 EMERGENCY DEPT VISIT HI MDM: CPT

## 2020-11-05 PROCEDURE — 94760 N-INVAS EAR/PLS OXIMETRY 1: CPT

## 2020-11-05 PROCEDURE — 700102 HCHG RX REV CODE 250 W/ 637 OVERRIDE(OP): Performed by: EMERGENCY MEDICINE

## 2020-11-05 PROCEDURE — 700105 HCHG RX REV CODE 258: Performed by: EMERGENCY MEDICINE

## 2020-11-05 PROCEDURE — 85025 COMPLETE CBC W/AUTO DIFF WBC: CPT

## 2020-11-05 PROCEDURE — 770021 HCHG ROOM/CARE - ISO PRIVATE

## 2020-11-05 PROCEDURE — 83880 ASSAY OF NATRIURETIC PEPTIDE: CPT

## 2020-11-05 PROCEDURE — 71045 X-RAY EXAM CHEST 1 VIEW: CPT

## 2020-11-05 PROCEDURE — G0378 HOSPITAL OBSERVATION PER HR: HCPCS

## 2020-11-05 PROCEDURE — 80053 COMPREHEN METABOLIC PANEL: CPT

## 2020-11-05 PROCEDURE — 770020 HCHG ROOM/CARE - TELE (206)

## 2020-11-05 PROCEDURE — A9270 NON-COVERED ITEM OR SERVICE: HCPCS | Performed by: EMERGENCY MEDICINE

## 2020-11-05 PROCEDURE — 87040 BLOOD CULTURE FOR BACTERIA: CPT

## 2020-11-05 RX ORDER — ONDANSETRON 4 MG/1
4 TABLET, ORALLY DISINTEGRATING ORAL EVERY 6 HOURS PRN
Status: DISCONTINUED | OUTPATIENT
Start: 2020-11-05 | End: 2020-11-08 | Stop reason: HOSPADM

## 2020-11-05 RX ORDER — LABETALOL HYDROCHLORIDE 5 MG/ML
10 INJECTION, SOLUTION INTRAVENOUS EVERY 4 HOURS PRN
Status: DISCONTINUED | OUTPATIENT
Start: 2020-11-05 | End: 2020-11-08 | Stop reason: HOSPADM

## 2020-11-05 RX ORDER — SODIUM CHLORIDE 9 MG/ML
1000 INJECTION, SOLUTION INTRAVENOUS ONCE
Status: COMPLETED | OUTPATIENT
Start: 2020-11-05 | End: 2020-11-05

## 2020-11-05 RX ORDER — ACETAMINOPHEN 325 MG/1
650 TABLET ORAL EVERY 6 HOURS PRN
Status: DISCONTINUED | OUTPATIENT
Start: 2020-11-05 | End: 2020-11-08 | Stop reason: HOSPADM

## 2020-11-05 RX ORDER — ASPIRIN 325 MG
325 TABLET ORAL DAILY
Status: DISCONTINUED | OUTPATIENT
Start: 2020-11-06 | End: 2020-11-08 | Stop reason: HOSPADM

## 2020-11-05 RX ORDER — POLYETHYLENE GLYCOL 3350 17 G/17G
1 POWDER, FOR SOLUTION ORAL
Status: DISCONTINUED | OUTPATIENT
Start: 2020-11-05 | End: 2020-11-08 | Stop reason: HOSPADM

## 2020-11-05 RX ORDER — GUAIFENESIN/DEXTROMETHORPHAN 100-10MG/5
10 SYRUP ORAL EVERY 6 HOURS PRN
Status: DISCONTINUED | OUTPATIENT
Start: 2020-11-05 | End: 2020-11-08 | Stop reason: HOSPADM

## 2020-11-05 RX ORDER — ONDANSETRON 2 MG/ML
4 INJECTION INTRAMUSCULAR; INTRAVENOUS EVERY 6 HOURS PRN
Status: DISCONTINUED | OUTPATIENT
Start: 2020-11-05 | End: 2020-11-08 | Stop reason: HOSPADM

## 2020-11-05 RX ORDER — DEXAMETHASONE 4 MG/1
6 TABLET ORAL DAILY
Status: DISCONTINUED | OUTPATIENT
Start: 2020-11-06 | End: 2020-11-08 | Stop reason: HOSPADM

## 2020-11-05 RX ORDER — GUAIFENESIN 600 MG/1
600 TABLET, EXTENDED RELEASE ORAL 2 TIMES DAILY PRN
Status: DISCONTINUED | OUTPATIENT
Start: 2020-11-05 | End: 2020-11-08 | Stop reason: HOSPADM

## 2020-11-05 RX ORDER — AMOXICILLIN 250 MG
2 CAPSULE ORAL 2 TIMES DAILY
Status: DISCONTINUED | OUTPATIENT
Start: 2020-11-05 | End: 2020-11-08 | Stop reason: HOSPADM

## 2020-11-05 RX ORDER — BISACODYL 10 MG
10 SUPPOSITORY, RECTAL RECTAL
Status: DISCONTINUED | OUTPATIENT
Start: 2020-11-05 | End: 2020-11-08 | Stop reason: HOSPADM

## 2020-11-05 RX ORDER — METHYLPREDNISOLONE 4 MG/1
4 TABLET ORAL DAILY
COMMUNITY
End: 2020-11-05

## 2020-11-05 RX ORDER — ACETAMINOPHEN 325 MG/1
650 TABLET ORAL ONCE
Status: COMPLETED | OUTPATIENT
Start: 2020-11-05 | End: 2020-11-05

## 2020-11-05 RX ADMIN — SODIUM CHLORIDE 1000 ML: 9 INJECTION, SOLUTION INTRAVENOUS at 21:02

## 2020-11-05 RX ADMIN — ACETAMINOPHEN 650 MG: 325 TABLET, FILM COATED ORAL at 21:02

## 2020-11-05 ASSESSMENT — FIBROSIS 4 INDEX: FIB4 SCORE: 2.26

## 2020-11-05 ASSESSMENT — PAIN DESCRIPTION - PAIN TYPE: TYPE: ACUTE PAIN

## 2020-11-06 PROBLEM — Z79.899 DVT PROPHYLAXIS: Status: ACTIVE | Noted: 2020-11-06

## 2020-11-06 LAB
ANION GAP SERPL CALC-SCNC: 12 MMOL/L (ref 7–16)
BASOPHILS # BLD AUTO: 0.2 % (ref 0–1.8)
BASOPHILS # BLD: 0.01 K/UL (ref 0–0.12)
BUN SERPL-MCNC: 15 MG/DL (ref 8–22)
CALCIUM SERPL-MCNC: 8.6 MG/DL (ref 8.5–10.5)
CHLORIDE SERPL-SCNC: 106 MMOL/L (ref 96–112)
CO2 SERPL-SCNC: 21 MMOL/L (ref 20–33)
CREAT SERPL-MCNC: 0.66 MG/DL (ref 0.5–1.4)
EOSINOPHIL # BLD AUTO: 0 K/UL (ref 0–0.51)
EOSINOPHIL NFR BLD: 0 % (ref 0–6.9)
ERYTHROCYTE [DISTWIDTH] IN BLOOD BY AUTOMATED COUNT: 41.3 FL (ref 35.9–50)
GLUCOSE SERPL-MCNC: 175 MG/DL (ref 65–99)
HCT VFR BLD AUTO: 38.9 % (ref 37–47)
HGB BLD-MCNC: 12.9 G/DL (ref 12–16)
IMM GRANULOCYTES # BLD AUTO: 0.07 K/UL (ref 0–0.11)
IMM GRANULOCYTES NFR BLD AUTO: 1.1 % (ref 0–0.9)
LYMPHOCYTES # BLD AUTO: 0.58 K/UL (ref 1–4.8)
LYMPHOCYTES NFR BLD: 8.9 % (ref 22–41)
MCH RBC QN AUTO: 29.8 PG (ref 27–33)
MCHC RBC AUTO-ENTMCNC: 33.2 G/DL (ref 33.6–35)
MCV RBC AUTO: 89.8 FL (ref 81.4–97.8)
MONOCYTES # BLD AUTO: 0.24 K/UL (ref 0–0.85)
MONOCYTES NFR BLD AUTO: 3.7 % (ref 0–13.4)
NEUTROPHILS # BLD AUTO: 5.62 K/UL (ref 2–7.15)
NEUTROPHILS NFR BLD: 86.1 % (ref 44–72)
NRBC # BLD AUTO: 0 K/UL
NRBC BLD-RTO: 0 /100 WBC
PLATELET # BLD AUTO: 279 K/UL (ref 164–446)
PMV BLD AUTO: 9.7 FL (ref 9–12.9)
POTASSIUM SERPL-SCNC: 3.9 MMOL/L (ref 3.6–5.5)
RBC # BLD AUTO: 4.33 M/UL (ref 4.2–5.4)
SODIUM SERPL-SCNC: 139 MMOL/L (ref 135–145)
WBC # BLD AUTO: 6.5 K/UL (ref 4.8–10.8)

## 2020-11-06 PROCEDURE — 770021 HCHG ROOM/CARE - ISO PRIVATE

## 2020-11-06 PROCEDURE — A9270 NON-COVERED ITEM OR SERVICE: HCPCS | Performed by: INTERNAL MEDICINE

## 2020-11-06 PROCEDURE — 700105 HCHG RX REV CODE 258: Performed by: INTERNAL MEDICINE

## 2020-11-06 PROCEDURE — 80048 BASIC METABOLIC PNL TOTAL CA: CPT

## 2020-11-06 PROCEDURE — 700102 HCHG RX REV CODE 250 W/ 637 OVERRIDE(OP): Performed by: INTERNAL MEDICINE

## 2020-11-06 PROCEDURE — 700111 HCHG RX REV CODE 636 W/ 250 OVERRIDE (IP): Performed by: INTERNAL MEDICINE

## 2020-11-06 PROCEDURE — 85025 COMPLETE CBC W/AUTO DIFF WBC: CPT

## 2020-11-06 PROCEDURE — 99233 SBSQ HOSP IP/OBS HIGH 50: CPT | Performed by: INTERNAL MEDICINE

## 2020-11-06 RX ADMIN — DOCUSATE SODIUM 50 MG AND SENNOSIDES 8.6 MG 2 TABLET: 8.6; 5 TABLET, FILM COATED ORAL at 05:23

## 2020-11-06 RX ADMIN — ENOXAPARIN SODIUM 40 MG: 40 INJECTION SUBCUTANEOUS at 05:23

## 2020-11-06 RX ADMIN — CEFTRIAXONE SODIUM 1000 MG: 1 INJECTION, POWDER, FOR SOLUTION INTRAMUSCULAR; INTRAVENOUS at 05:23

## 2020-11-06 RX ADMIN — DEXAMETHASONE 6 MG: 6 TABLET ORAL at 05:24

## 2020-11-06 RX ADMIN — ASPIRIN 325 MG: 325 TABLET, FILM COATED ORAL at 05:23

## 2020-11-06 ASSESSMENT — LIFESTYLE VARIABLES
EVER FELT BAD OR GUILTY ABOUT YOUR DRINKING: NO
EVER HAD A DRINK FIRST THING IN THE MORNING TO STEADY YOUR NERVES TO GET RID OF A HANGOVER: NO
HOW MANY TIMES IN THE PAST YEAR HAVE YOU HAD 5 OR MORE DRINKS IN A DAY: 0
TOTAL SCORE: 0
TOTAL SCORE: 0
HAVE PEOPLE ANNOYED YOU BY CRITICIZING YOUR DRINKING: NO
TOTAL SCORE: 0
EVER HAD A DRINK FIRST THING IN THE MORNING TO STEADY YOUR NERVES TO GET RID OF A HANGOVER: NO
DOES PATIENT WANT TO STOP DRINKING: NO
ON A TYPICAL DAY WHEN YOU DRINK ALCOHOL HOW MANY DRINKS DO YOU HAVE: 0
AVERAGE NUMBER OF DAYS PER WEEK YOU HAVE A DRINK CONTAINING ALCOHOL: 0
ALCOHOL_USE: NO
CONSUMPTION TOTAL: INCOMPLETE
HAVE YOU EVER FELT YOU SHOULD CUT DOWN ON YOUR DRINKING: NO
HAVE PEOPLE ANNOYED YOU BY CRITICIZING YOUR DRINKING: NO
HAVE YOU EVER FELT YOU SHOULD CUT DOWN ON YOUR DRINKING: NO
ALCOHOL_USE: NO
EVER FELT BAD OR GUILTY ABOUT YOUR DRINKING: NO
TOTAL SCORE: 0
CONSUMPTION TOTAL: NEGATIVE

## 2020-11-06 ASSESSMENT — PATIENT HEALTH QUESTIONNAIRE - PHQ9
2. FEELING DOWN, DEPRESSED, IRRITABLE, OR HOPELESS: NOT AT ALL
SUM OF ALL RESPONSES TO PHQ9 QUESTIONS 1 AND 2: 0
1. LITTLE INTEREST OR PLEASURE IN DOING THINGS: NOT AT ALL

## 2020-11-06 ASSESSMENT — ENCOUNTER SYMPTOMS
EYES NEGATIVE: 1
CHILLS: 1
HEMOPTYSIS: 0
WHEEZING: 1
PALPITATIONS: 1
FEVER: 1
SPUTUM PRODUCTION: 0
SHORTNESS OF BREATH: 1
NEUROLOGICAL NEGATIVE: 1
COUGH: 1
PND: 0
CLAUDICATION: 0
GASTROINTESTINAL NEGATIVE: 1
ORTHOPNEA: 0
PSYCHIATRIC NEGATIVE: 1

## 2020-11-06 ASSESSMENT — FIBROSIS 4 INDEX
FIB4 SCORE: 1.24
FIB4 SCORE: 1.24

## 2020-11-06 ASSESSMENT — COPD QUESTIONNAIRES
HAVE YOU SMOKED AT LEAST 100 CIGARETTES IN YOUR ENTIRE LIFE: NO/DON'T KNOW
DURING THE PAST 4 WEEKS HOW MUCH DID YOU FEEL SHORT OF BREATH: SOME OF THE TIME
COPD SCREENING SCORE: 5
DO YOU EVER COUGH UP ANY MUCUS OR PHLEGM?: YES, A FEW DAYS A WEEK OR MONTH

## 2020-11-06 NOTE — ED NOTES
Med rec complete per phone call with pt home pharmacy. Unable to interview pt at this time. Pt picked up a 7 day course of Augmentin 875/125 1 tab bid on 11/4 and a 10 day course of doxycyline 100mg 1 cap bid also on 11/4. Unable to review allergies.

## 2020-11-06 NOTE — PROGRESS NOTES
[facility] TRIAGE [facility]ICER ER REPORT  Consult/Admission requested by: Dr Cha  Chief complaint: SOB  Pertinent history/ER Course: COVID 19 pneumonia with hypoxia  Patient meets admission criterion: Yes..  Recommendations given or work up & consultations requested per triage officer: COVID19  pneumonia  Consultants involved and pertinent input from consultants:   Admission status: Observation.   Admission order placed: Yes.   Floor requested:   Assigned hospitalist: Dr Martinez

## 2020-11-06 NOTE — ASSESSMENT & PLAN NOTE
86% on room air on admission  Patient  recently diagnosed with COVID-19 on 10/28/2020 and has had increasing shortness of breath since.  Patient was seen in the emergency department on 11/3 continued shortness of breath and was discharged with Augmentin, doxycycline.    No leukocytosis, negative procalcitonin: stop antibiotics  Wean oxygen as tolerated  Supportive care

## 2020-11-06 NOTE — CARE PLAN
Safety: patient has been provided education to ensure continued safety while in hospital.    Infection: patient is receiving IV antibiotics and is progressing as expected.     Respiratory: patient is starting incentive spirometry.

## 2020-11-06 NOTE — H&P
Hospital Medicine History & Physical Note    Date of Service  11/6/2020    Primary Care Physician  Robbie Hooker M.D.    Consultants      Code Status  Full Code    Chief Complaint  Chief Complaint   Patient presents with   • Shortness of Breath     pt dx w PNA 2 days ago and placed on abx; pt c/o her SOB has increased; pt normally not on O2 but got down to 85% on RA and placed on O2 in triage; pt c/o unproductive cough and 102f fever each night; last ibuprofen last pm       History of Presenting Illness  60 y.o. speaking female who presented 11/5/2020 with acute respiratory failure with hypoxia of 85% on room air and Covid pneumonia diagnosed November 3 discharged stable on Medrol, Augmentin and doxycycline.  Worsening hypoxia and fever.  He is referred to the hospitalist for admission.  She has known Covid positive social contacts.    Review of Systems  Review of Systems   Constitutional: Positive for chills, fever and malaise/fatigue.   HENT: Negative.    Eyes: Negative.    Respiratory: Positive for cough, shortness of breath and wheezing. Negative for hemoptysis and sputum production.    Cardiovascular: Positive for palpitations. Negative for chest pain, orthopnea, claudication, leg swelling and PND.   Gastrointestinal: Negative.    Genitourinary: Negative.    Neurological: Negative.    Endo/Heme/Allergies: Negative.    Psychiatric/Behavioral: Negative.        Past Medical History   has no past medical history on file.    Surgical History   has no past surgical history on file.     Family History  family history is not on file.     Social History   reports that she has never smoked. She has never used smokeless tobacco. She reports that she does not drink alcohol or use drugs.    Allergies  No Known Allergies    Medications  Prior to Admission Medications   Prescriptions Last Dose Informant Patient Reported? Taking?   albuterol 108 (90 Base) MCG/ACT Aero Soln inhalation aerosol unknown at unknown Patient's  Home Pharmacy No No   Sig: Inhale 2 Puffs by mouth every 6 hours as needed for Shortness of Breath.   Patient not taking: Reported on 10/28/2020   amoxicillin-clavulanate (AUGMENTIN) 875-125 MG Tab unknown at unknown Patient's Home Pharmacy No No   Sig: Take 1 Tab by mouth 2 times a day for 7 days.   doxycycline (MONODOX) 100 MG capsule unknown at unknown Patient's Home Pharmacy No No   Sig: Take 1 Cap by mouth 2 times a day for 10 days.      Facility-Administered Medications: None       Physical Exam  Temp:  [36.1 °C (97 °F)-36.7 °C (98.1 °F)] 36.1 °C (97 °F)  Pulse:  [67-92] 73  Resp:  [12-18] 18  BP: (132-136)/(61-79) 136/61  SpO2:  [86 %-100 %] 97 %    Physical Exam    Laboratory:  Recent Labs     11/03/20 1206 11/05/20 2037   WBC 6.6 8.7   RBC 4.71 4.99   HEMOGLOBIN 14.2 15.0   HEMATOCRIT 42.2 44.9   MCV 89.6 90.0   MCH 30.1 30.1   MCHC 33.6 33.4*   RDW 41.1 41.5   PLATELETCT 164 309   MPV 10.3 9.7     Recent Labs     11/03/20 1206 11/05/20 2037   SODIUM 130* 140   POTASSIUM 3.3* 4.0   CHLORIDE 96 101   CO2 27 25   GLUCOSE 158* 178*   BUN 10 15   CREATININE 0.59 0.78   CALCIUM 8.7 9.7     Recent Labs     11/03/20 1206 11/05/20 2037   ALTSGPT 54* 97*   ASTSGOT 44 61*   ALKPHOSPHAT 83 95   TBILIRUBIN 0.4 0.4   GLUCOSE 158* 178*         Recent Labs     11/05/20 2037   NTPROBNP 205*         Recent Labs     11/03/20 1206   TROPONINT <6       Imaging:  DX-CHEST-PORTABLE (1 VIEW)   Final Result         1.  Interstitial pulmonary parenchymal prominence, compatible with interstitial edema and/or infiltrates.            Assessment/Plan:  I anticipate this patient will require at least two midnights for appropriate medical management, necessitating inpatient admission.    Acute respiratory failure (HCC)  Assessment & Plan  Secondary to #1, supplemental oxygen, incentive spirometry and proning as needed    Pneumonia due to COVID-19 virus  Assessment & Plan  Covid pneumonia care set, supplemental oxygen, incentive  spirometry, albuterol    DVT prophylaxis  Assessment & Plan  JYOTI stockings and Lovenox ordered

## 2020-11-06 NOTE — ED PROVIDER NOTES
"ED Provider Note    CHIEF COMPLAINT  Chief Complaint   Patient presents with   • Shortness of Breath     pt dx w PNA 2 days ago and placed on abx; pt c/o her SOB has increased; pt normally not on O2 but got down to 85% on RA and placed on O2 in triage; pt c/o unproductive cough and 102f fever each night; last ibuprofen last pm       HPI  Amber House is a 60 y.o. female who presents to the emerge department for worsening shortness of breath.   became symptomatic approximately 2 weeks ago diagnosed with Covid and was hospitalized and discharged earlier today.  She was also diagnosed with Covid this week and has been symptomatic for approximately 1 week.  Now with 2 days of worsening shortness of breath after the known diagnosis.  She was reported to have \"pneumonia \"on the x-ray was started on antibiotics.  She was able to take some ibuprofen last night.  No ibuprofen or Tylenol today.    REVIEW OF SYSTEMS  See HPI for further details. All other systems are negative.     PAST MEDICAL HISTORY       SOCIAL HISTORY  Social History     Tobacco Use   • Smoking status: Never Smoker   • Smokeless tobacco: Never Used   Substance and Sexual Activity   • Alcohol use: Never     Frequency: Never   • Drug use: Never   • Sexual activity: Not on file       SURGICAL HISTORY  patient denies any surgical history    CURRENT MEDICATIONS  Home Medications     Reviewed by Hallie Haskins (Pharmacy Tech) on 11/05/20 at 2358  Med List Status: Complete   Medication Last Dose Status   albuterol 108 (90 Base) MCG/ACT Aero Soln inhalation aerosol unknown Active   amoxicillin-clavulanate (AUGMENTIN) 875-125 MG Tab unknown Active   doxycycline (MONODOX) 100 MG capsule unknown Active                ALLERGIES  No Known Allergies    PHYSICAL EXAM  VITAL SIGNS: /61   Pulse 73   Temp 36.1 °C (97 °F) (Tympanic)   Resp 18   Ht 1.626 m (5' 4\")   Wt 69.8 kg (153 lb 14.1 oz)   SpO2 97%   BMI 26.41 kg/m²  @FELIPE[[id number]::@ "   Pulse ox interpretation: I interpret this pulse ox as normal.  Constitutional: Alert in no apparent distress.  HENT: No signs of trauma, Bilateral external ears normal, Nose normal.   Eyes: Pupils are equal and reactive, Conjunctiva normal, Non-icteric.   Neck: Normal range of motion, No tenderness, Supple, No stridor. .   Cardiovascular: Regular rate and rhythm, no murmurs.   Thorax & Lungs: Normal breath sounds, No wheezing, No chest tenderness.  Dyspneic with speech  Abdomen: Bowel sounds normal, Soft, No tenderness, No masses, No pulsatile masses. No peritoneal signs.  Skin: Warm, Dry, No erythema, No rash.   Extremities: Intact distal pulses, No edema, No tenderness  Musculoskeletal: Good range of motion in all major joints. No tenderness to palpation or major deformities noted.   Neurologic: Alert , Normal motor function, Normal sensory function, No focal deficits noted.   Psychiatric: Affect normal, Judgment normal, Mood normal.       DIAGNOSTIC STUDIES / PROCEDURES    EKG  Results for orders placed or performed during the hospital encounter of 20   EKG   Result Value Ref Range    Report       [facility] Emergency Dept.    Test Date:  2020  Pt Name:    SILVIO JUSTIN       Department: ER  MRN:        [id number]                      Room:  Gender:     Female                       Technician: [id number]  :        [id number]                   Requested By:ER TRIAGE PROTOCOL  Order #:    [id number]                    Reading MD: MARY BLANTON DO    Measurements  Intervals                                Axis  Rate:       84                           P:          7  SD:         148                          QRS:        3  QRSD:       90                           T:          8  QT:         368  QTc:        436    Interpretive Statements  SINUS RHYTHM  No previous ECG available for comparison  Electronically Signed On 11-3-2020 14:46:41 PST by MARY BLANTON DO          LABS  Results for orders placed or performed during the hospital encounter of 11/05/20   CBC WITH DIFFERENTIAL   Result Value Ref Range    WBC 8.7 4.8 - 10.8 K/uL    RBC 4.99 4.20 - 5.40 M/uL    Hemoglobin 15.0 12.0 - 16.0 g/dL    Hematocrit 44.9 37.0 - 47.0 %    MCV 90.0 81.4 - 97.8 fL    MCH 30.1 27.0 - 33.0 pg    MCHC 33.4 (L) 33.6 - 35.0 g/dL    RDW 41.5 35.9 - 50.0 fL    Platelet Count 309 164 - 446 K/uL    MPV 9.7 9.0 - 12.9 fL    Neutrophils-Polys 78.40 (H) 44.00 - 72.00 %    Lymphocytes 13.80 (L) 22.00 - 41.00 %    Monocytes 6.90 0.00 - 13.40 %    Eosinophils 0.10 0.00 - 6.90 %    Basophils 0.10 0.00 - 1.80 %    Immature Granulocytes 0.70 0.00 - 0.90 %    Nucleated RBC 0.00 /100 WBC    Neutrophils (Absolute) 6.82 2.00 - 7.15 K/uL    Lymphs (Absolute) 1.20 1.00 - 4.80 K/uL    Monos (Absolute) 0.60 0.00 - 0.85 K/uL    Eos (Absolute) 0.01 0.00 - 0.51 K/uL    Baso (Absolute) 0.01 0.00 - 0.12 K/uL    Immature Granulocytes (abs) 0.06 0.00 - 0.11 K/uL    NRBC (Absolute) 0.00 K/uL   COMP METABOLIC PANEL   Result Value Ref Range    Sodium 140 135 - 145 mmol/L    Potassium 4.0 3.6 - 5.5 mmol/L    Chloride 101 96 - 112 mmol/L    Co2 25 20 - 33 mmol/L    Anion Gap 14.0 7.0 - 16.0    Glucose 178 (H) 65 - 99 mg/dL    Bun 15 8 - 22 mg/dL    Creatinine 0.78 0.50 - 1.40 mg/dL    Calcium 9.7 8.5 - 10.5 mg/dL    AST(SGOT) 61 (H) 12 - 45 U/L    ALT(SGPT) 97 (H) 2 - 50 U/L    Alkaline Phosphatase 95 30 - 99 U/L    Total Bilirubin 0.4 0.1 - 1.5 mg/dL    Albumin 4.2 3.2 - 4.9 g/dL    Total Protein 7.8 6.0 - 8.2 g/dL    Globulin 3.6 (H) 1.9 - 3.5 g/dL    A-G Ratio 1.2 g/dL   proBrain Natriuretic Peptide, NT   Result Value Ref Range    NT-proBNP 205 (H) 0 - 125 pg/mL   MAGNESIUM   Result Value Ref Range    Magnesium 2.4 1.5 - 2.5 mg/dL   ESTIMATED GFR   Result Value Ref Range    GFR If African American >60 >60 mL/min/1.73 m 2    GFR If Non African American >60 >60 mL/min/1.73 m 2         RADIOLOGY  DX-CHEST-PORTABLE (1 VIEW)    Final Result         1.  Interstitial pulmonary parenchymal prominence, compatible with interstitial edema and/or infiltrates.          I verified that the patient was wearing a mask and I was wearing appropriate PPE every time I entered the room. The patient's mask was on the patient at all times during my encounter except for a brief view of the oropharynx.    CRITICAL CARE  The very real possibilty of a deterioration of this patient's condition required the highest level of my preparedness for sudden, emergent intervention.  I provided critical care services, which included medication orders, frequent reevaluations of the patient's condition and response to treatment, ordering and reviewing test results, and discussing the case with various consultants.  The critical care time associated with the care of the patient was 30 minutes. Review chart for interventions. This time is exclusive of any other billable procedures.       COURSE & MEDICAL DECISION MAKING  Pertinent Labs & Imaging studies reviewed. (See chart for details)  Patient presented the emerge department for further evaluation of worsening shortness of breath.  History as above.  Known Covid positive with  positive also.  Now worsening hypoxia on evaluation tonight.  Chest x-ray findings as noted above.  This point I do believe the patient will require ongoing inpatient hospitalization secondary to her need for oxygen supplementation.  Patient is now being brought into the hospital service after my discussion with them.    FINAL IMPRESSION  1. COVID-19    2. Hypoxia            Electronically signed by: Arnie Cha M.D., 11/5/2020 9:04 PM

## 2020-11-06 NOTE — PROGRESS NOTES
Patient was seen and examined at bedside with  present.  Patient states that she was recently diagnosed with COVID-19 on 10/28/2020 and has had increasing shortness of breath since.  Patient was seen in the emergency department on 11/3 continued shortness of breath and was discharged with Augmentin, doxycycline.  Patient reports continued shortness of breath, cough over the next several days.  Upon presentation SpO2 86% on room air, no leukocytosis.  Rocephin, Decadron initiated.  Will check inflammatory markers in a.m.  Continue supportive care.

## 2020-11-06 NOTE — ASSESSMENT & PLAN NOTE
COVID diagnosed 10/28, seen in ED 11/3 and sent home with antibiotics but failed to improve  Again presented 11/6 with hypoxia  Supportive care  Trend inflammator markers, procal negative, CRP negative, D-dimer 2.37

## 2020-11-06 NOTE — ED TRIAGE NOTES
Amber [facility]ble [facility]  60 y.o.  Chief Complaint   Patient presents with   • Shortness of Breath     pt dx w PNA 2 days ago and placed on abx; pt c/o her SOB has increased; pt normally not on O2 but got down to 85% on RA and placed on O2 in triage; pt c/o unproductive cough and 102f fever each night; last ibuprofen last pm   pt is currently covid positive; pt's triage was completed w the use of language line with  Jim #[id number]

## 2020-11-07 LAB
ALBUMIN SERPL BCP-MCNC: 3.2 G/DL (ref 3.2–4.9)
BASOPHILS # BLD AUTO: 0.2 % (ref 0–1.8)
BASOPHILS # BLD: 0.01 K/UL (ref 0–0.12)
BUN SERPL-MCNC: 16 MG/DL (ref 8–22)
CALCIUM SERPL-MCNC: 9.1 MG/DL (ref 8.5–10.5)
CHLORIDE SERPL-SCNC: 100 MMOL/L (ref 96–112)
CO2 SERPL-SCNC: 25 MMOL/L (ref 20–33)
CREAT SERPL-MCNC: 0.56 MG/DL (ref 0.5–1.4)
CRP SERPL HS-MCNC: 0.75 MG/DL (ref 0–0.75)
D DIMER PPP IA.FEU-MCNC: 2.37 UG/ML (FEU) (ref 0–0.5)
EOSINOPHIL # BLD AUTO: 0.03 K/UL (ref 0–0.51)
EOSINOPHIL NFR BLD: 0.5 % (ref 0–6.9)
ERYTHROCYTE [DISTWIDTH] IN BLOOD BY AUTOMATED COUNT: 41.3 FL (ref 35.9–50)
GLUCOSE SERPL-MCNC: 133 MG/DL (ref 65–99)
HCT VFR BLD AUTO: 39.2 % (ref 37–47)
HGB BLD-MCNC: 13 G/DL (ref 12–16)
IMM GRANULOCYTES # BLD AUTO: 0.08 K/UL (ref 0–0.11)
IMM GRANULOCYTES NFR BLD AUTO: 1.2 % (ref 0–0.9)
LYMPHOCYTES # BLD AUTO: 1.1 K/UL (ref 1–4.8)
LYMPHOCYTES NFR BLD: 16.8 % (ref 22–41)
MAGNESIUM SERPL-MCNC: 2.2 MG/DL (ref 1.5–2.5)
MCH RBC QN AUTO: 29.8 PG (ref 27–33)
MCHC RBC AUTO-ENTMCNC: 33.2 G/DL (ref 33.6–35)
MCV RBC AUTO: 89.9 FL (ref 81.4–97.8)
MONOCYTES # BLD AUTO: 0.68 K/UL (ref 0–0.85)
MONOCYTES NFR BLD AUTO: 10.4 % (ref 0–13.4)
NEUTROPHILS # BLD AUTO: 4.65 K/UL (ref 2–7.15)
NEUTROPHILS NFR BLD: 70.9 % (ref 44–72)
NRBC # BLD AUTO: 0 K/UL
NRBC BLD-RTO: 0 /100 WBC
PHOSPHATE SERPL-MCNC: 3 MG/DL (ref 2.5–4.5)
PLATELET # BLD AUTO: 291 K/UL (ref 164–446)
PMV BLD AUTO: 9.8 FL (ref 9–12.9)
POTASSIUM SERPL-SCNC: 4 MMOL/L (ref 3.6–5.5)
PROCALCITONIN SERPL-MCNC: <0.05 NG/ML
RBC # BLD AUTO: 4.36 M/UL (ref 4.2–5.4)
SODIUM SERPL-SCNC: 137 MMOL/L (ref 135–145)
WBC # BLD AUTO: 6.6 K/UL (ref 4.8–10.8)

## 2020-11-07 PROCEDURE — 86140 C-REACTIVE PROTEIN: CPT

## 2020-11-07 PROCEDURE — 83735 ASSAY OF MAGNESIUM: CPT

## 2020-11-07 PROCEDURE — 80069 RENAL FUNCTION PANEL: CPT

## 2020-11-07 PROCEDURE — 700105 HCHG RX REV CODE 258: Performed by: INTERNAL MEDICINE

## 2020-11-07 PROCEDURE — 85379 FIBRIN DEGRADATION QUANT: CPT

## 2020-11-07 PROCEDURE — 84145 PROCALCITONIN (PCT): CPT

## 2020-11-07 PROCEDURE — 85025 COMPLETE CBC W/AUTO DIFF WBC: CPT

## 2020-11-07 PROCEDURE — A9270 NON-COVERED ITEM OR SERVICE: HCPCS | Performed by: INTERNAL MEDICINE

## 2020-11-07 PROCEDURE — 700111 HCHG RX REV CODE 636 W/ 250 OVERRIDE (IP): Performed by: INTERNAL MEDICINE

## 2020-11-07 PROCEDURE — 770021 HCHG ROOM/CARE - ISO PRIVATE

## 2020-11-07 PROCEDURE — 700102 HCHG RX REV CODE 250 W/ 637 OVERRIDE(OP): Performed by: INTERNAL MEDICINE

## 2020-11-07 PROCEDURE — 99232 SBSQ HOSP IP/OBS MODERATE 35: CPT | Performed by: INTERNAL MEDICINE

## 2020-11-07 RX ADMIN — DEXAMETHASONE 6 MG: 6 TABLET ORAL at 04:23

## 2020-11-07 RX ADMIN — CEFTRIAXONE SODIUM 1000 MG: 1 INJECTION, POWDER, FOR SOLUTION INTRAMUSCULAR; INTRAVENOUS at 04:23

## 2020-11-07 RX ADMIN — ENOXAPARIN SODIUM 40 MG: 40 INJECTION SUBCUTANEOUS at 04:23

## 2020-11-07 RX ADMIN — ASPIRIN 325 MG: 325 TABLET, FILM COATED ORAL at 04:23

## 2020-11-07 ASSESSMENT — ENCOUNTER SYMPTOMS
NAUSEA: 0
VOMITING: 0
GASTROINTESTINAL NEGATIVE: 1
FEVER: 1
CHILLS: 1
SHORTNESS OF BREATH: 1
ABDOMINAL PAIN: 0
EYES NEGATIVE: 1
HEADACHES: 0
NEUROLOGICAL NEGATIVE: 1
DIARRHEA: 0
PSYCHIATRIC NEGATIVE: 1
DIZZINESS: 0
COUGH: 1

## 2020-11-07 ASSESSMENT — PAIN DESCRIPTION - PAIN TYPE: TYPE: ACUTE PAIN

## 2020-11-07 NOTE — PROGRESS NOTES
Assumed pt care at 0700 with Rowdy THORNTON. Received report from Jackie THORNTON. A&O x4. Pt reports pain on inspiration. Mild work of breathing noted, SpO2 95% on 2 L.  Updated on POC, communication board updated. Bed locked and in lowest position. Call light and belongings within reach. Non-skid socks in place. Needs met, will continue to monitor.

## 2020-11-07 NOTE — PROGRESS NOTES
[facility] Medicine Daily Progress Note    Date of Service  11/7/2020    Chief Complaint  60 y.o. female admitted 11/5/2020 with shortness of breath.     Hospital Course  Patient  recently diagnosed with COVID-19 on 10/28/2020 and has had increasing shortness of breath since.  Patient was seen in the emergency department on 11/3 continued shortness of breath and was discharged with Augmentin, doxycycline.  Patient reports continued shortness of breath, cough over the next several days.  Upon presentation SpO2 86% on room air, no leukocytosis.  Rocephin, Decadron initiated    Interval Problem Update  The patient was seen and examined at bedside with . No acute events over night. Patient is resting comfortably in bed and in no acute distress    Wean O2  Discontinue antibiotics in setting of no leukocytosis, negative procal  Continue steroids    Consultants/Specialty  none    Code Status  Full Code    Disposition  Anticipate discharge home with home O2 tomorrow    Review of Systems  Review of Systems   Constitutional: Positive for chills, fever and malaise/fatigue.   HENT: Negative.    Eyes: Negative.    Respiratory: Positive for cough and shortness of breath.    Cardiovascular: Negative for chest pain.   Gastrointestinal: Negative.  Negative for abdominal pain, diarrhea, nausea and vomiting.   Genitourinary: Negative.    Neurological: Negative.  Negative for dizziness and headaches.   Endo/Heme/Allergies: Negative.    Psychiatric/Behavioral: Negative.         Physical Exam  Temp:  [36.3 °C (97.3 °F)-36.6 °C (97.9 °F)] 36.4 °C (97.5 °F)  Pulse:  [51-76] 65  Resp:  [18-20] 18  BP: (117-147)/(62-73) 135/63  SpO2:  [95 %-96 %] 95 %    Physical Exam  Constitutional:       General: She is not in acute distress.     Appearance: Normal appearance. She is normal weight. She is not ill-appearing or toxic-appearing.   HENT:      Head: Normocephalic and atraumatic.   Eyes:      Extraocular Movements: Extraocular  movements intact.      Conjunctiva/sclera: Conjunctivae normal.      Pupils: Pupils are equal, round, and reactive to light.   Neck:      Musculoskeletal: Neck supple. No neck rigidity.   Cardiovascular:      Rate and Rhythm: Normal rate.      Pulses: Normal pulses.      Heart sounds: No murmur. No gallop.    Pulmonary:      Effort: No respiratory distress.      Breath sounds: No wheezing.   Abdominal:      General: Abdomen is flat. Bowel sounds are normal.      Palpations: There is no mass.      Tenderness: There is no abdominal tenderness. There is no guarding.   Musculoskeletal: Normal range of motion.   Skin:     Coloration: Skin is not pale.      Findings: No erythema.   Neurological:      General: No focal deficit present.      Mental Status: She is alert and oriented to person, place, and time. Mental status is at baseline.         Fluids  No intake or output data in the 24 hours ending 11/07/20 1433    Laboratory  Recent Labs     11/05/20 2037 11/06/20  1320 11/07/20  0425   WBC 8.7 6.5 6.6   RBC 4.99 4.33 4.36   HEMOGLOBIN 15.0 12.9 13.0   HEMATOCRIT 44.9 38.9 39.2   MCV 90.0 89.8 89.9   MCH 30.1 29.8 29.8   MCHC 33.4* 33.2* 33.2*   RDW 41.5 41.3 41.3   PLATELETCT 309 279 291   MPV 9.7 9.7 9.8     Recent Labs     11/05/20 2037 11/06/20  0328 11/07/20  0425   SODIUM 140 139 137   POTASSIUM 4.0 3.9 4.0   CHLORIDE 101 106 100   CO2 25 21 25   GLUCOSE 178* 175* 133*   BUN 15 15 16   CREATININE 0.78 0.66 0.56   CALCIUM 9.7 8.6 9.1                   Imaging  DX-CHEST-PORTABLE (1 VIEW)   Final Result         1.  Interstitial pulmonary parenchymal prominence, compatible with interstitial edema and/or infiltrates.           Assessment/Plan  * Acute respiratory failure (HCC)  Assessment & Plan  86% on room air on admission  Patient  recently diagnosed with COVID-19 on 10/28/2020 and has had increasing shortness of breath since.  Patient was seen in the emergency department on 11/3 continued shortness of breath and  was discharged with Augmentin, doxycycline.    No leukocytosis, negative procalcitonin: stop antibiotics  Wean oxygen as tolerated  Supportive care    Pneumonia due to COVID-19 virus  Assessment & Plan  COVID diagnosed 10/28, seen in ED 11/3 and sent home with antibiotics but failed to improve  Again presented 11/6 with hypoxia  Supportive care  Trend inflammator markers, procal negative, CRP negative, D-dimer 2.37    DVT prophylaxis  Assessment & Plan  JYOTI stockings and Lovenox ordered       VTE prophylaxis: Lovenox

## 2020-11-08 VITALS
DIASTOLIC BLOOD PRESSURE: 56 MMHG | HEIGHT: 64 IN | WEIGHT: 153.88 LBS | SYSTOLIC BLOOD PRESSURE: 110 MMHG | BODY MASS INDEX: 26.27 KG/M2 | OXYGEN SATURATION: 96 % | TEMPERATURE: 97 F | HEART RATE: 70 BPM | RESPIRATION RATE: 18 BRPM

## 2020-11-08 LAB
ALBUMIN SERPL BCP-MCNC: 3.3 G/DL (ref 3.2–4.9)
BASOPHILS # BLD AUTO: 0.1 % (ref 0–1.8)
BASOPHILS # BLD: 0.01 K/UL (ref 0–0.12)
BUN SERPL-MCNC: 16 MG/DL (ref 8–22)
CALCIUM SERPL-MCNC: 8.8 MG/DL (ref 8.5–10.5)
CHLORIDE SERPL-SCNC: 106 MMOL/L (ref 96–112)
CO2 SERPL-SCNC: 28 MMOL/L (ref 20–33)
CREAT SERPL-MCNC: 0.6 MG/DL (ref 0.5–1.4)
EOSINOPHIL # BLD AUTO: 0.07 K/UL (ref 0–0.51)
EOSINOPHIL NFR BLD: 1 % (ref 0–6.9)
ERYTHROCYTE [DISTWIDTH] IN BLOOD BY AUTOMATED COUNT: 40.4 FL (ref 35.9–50)
GLUCOSE SERPL-MCNC: 111 MG/DL (ref 65–99)
HCT VFR BLD AUTO: 41.3 % (ref 37–47)
HGB BLD-MCNC: 13.6 G/DL (ref 12–16)
IMM GRANULOCYTES # BLD AUTO: 0.21 K/UL (ref 0–0.11)
IMM GRANULOCYTES NFR BLD AUTO: 3 % (ref 0–0.9)
LYMPHOCYTES # BLD AUTO: 1.44 K/UL (ref 1–4.8)
LYMPHOCYTES NFR BLD: 20.4 % (ref 22–41)
MAGNESIUM SERPL-MCNC: 2.2 MG/DL (ref 1.5–2.5)
MCH RBC QN AUTO: 29.6 PG (ref 27–33)
MCHC RBC AUTO-ENTMCNC: 32.9 G/DL (ref 33.6–35)
MCV RBC AUTO: 89.8 FL (ref 81.4–97.8)
MONOCYTES # BLD AUTO: 0.61 K/UL (ref 0–0.85)
MONOCYTES NFR BLD AUTO: 8.6 % (ref 0–13.4)
NEUTROPHILS # BLD AUTO: 4.73 K/UL (ref 2–7.15)
NEUTROPHILS NFR BLD: 66.9 % (ref 44–72)
NRBC # BLD AUTO: 0 K/UL
NRBC BLD-RTO: 0 /100 WBC
PHOSPHATE SERPL-MCNC: 3 MG/DL (ref 2.5–4.5)
PLATELET # BLD AUTO: 327 K/UL (ref 164–446)
PMV BLD AUTO: 9.7 FL (ref 9–12.9)
POTASSIUM SERPL-SCNC: 4.1 MMOL/L (ref 3.6–5.5)
RBC # BLD AUTO: 4.6 M/UL (ref 4.2–5.4)
SODIUM SERPL-SCNC: 141 MMOL/L (ref 135–145)
WBC # BLD AUTO: 7.1 K/UL (ref 4.8–10.8)

## 2020-11-08 PROCEDURE — 700102 HCHG RX REV CODE 250 W/ 637 OVERRIDE(OP): Performed by: INTERNAL MEDICINE

## 2020-11-08 PROCEDURE — 83735 ASSAY OF MAGNESIUM: CPT

## 2020-11-08 PROCEDURE — 80069 RENAL FUNCTION PANEL: CPT

## 2020-11-08 PROCEDURE — 99239 HOSP IP/OBS DSCHRG MGMT >30: CPT | Performed by: INTERNAL MEDICINE

## 2020-11-08 PROCEDURE — 700111 HCHG RX REV CODE 636 W/ 250 OVERRIDE (IP): Performed by: INTERNAL MEDICINE

## 2020-11-08 PROCEDURE — 85025 COMPLETE CBC W/AUTO DIFF WBC: CPT

## 2020-11-08 PROCEDURE — A9270 NON-COVERED ITEM OR SERVICE: HCPCS | Performed by: INTERNAL MEDICINE

## 2020-11-08 RX ORDER — GUAIFENESIN 600 MG/1
600 TABLET, EXTENDED RELEASE ORAL 2 TIMES DAILY PRN
Qty: 28 TAB | Refills: 0 | Status: SHIPPED | OUTPATIENT
Start: 2020-11-08 | End: 2024-01-29

## 2020-11-08 RX ORDER — DEXAMETHASONE 6 MG/1
6 TABLET ORAL DAILY
Qty: 10 TAB | Refills: 0 | Status: SHIPPED | OUTPATIENT
Start: 2020-11-09 | End: 2024-01-29

## 2020-11-08 RX ADMIN — DEXAMETHASONE 6 MG: 6 TABLET ORAL at 05:18

## 2020-11-08 RX ADMIN — ENOXAPARIN SODIUM 40 MG: 40 INJECTION SUBCUTANEOUS at 05:18

## 2020-11-08 RX ADMIN — ASPIRIN 325 MG: 325 TABLET, FILM COATED ORAL at 05:18

## 2020-11-08 ASSESSMENT — PATIENT HEALTH QUESTIONNAIRE - PHQ9
1. LITTLE INTEREST OR PLEASURE IN DOING THINGS: NOT AT ALL
SUM OF ALL RESPONSES TO PHQ9 QUESTIONS 1 AND 2: 0
2. FEELING DOWN, DEPRESSED, IRRITABLE, OR HOPELESS: NOT AT ALL

## 2020-11-08 ASSESSMENT — PAIN DESCRIPTION - PAIN TYPE: TYPE: ACUTE PAIN
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
## 2020-11-08 NOTE — DISCHARGE SUMMARY
Discharge Summary    CHIEF COMPLAINT ON ADMISSION  Chief Complaint   Patient presents with   • Shortness of Breath     pt dx w PNA 2 days ago and placed on abx; pt c/o her SOB has increased; pt normally not on O2 but got down to 85% on RA and placed on O2 in triage; pt c/o unproductive cough and 102f fever each night; last ibuprofen last pm       Reason for Admission  Flu Like Symptoms     Admission Date  11/5/2020    CODE STATUS  Full Code    HPI & HOSPITAL COURSE  This is a 60 y.o. female here with shortness of breath. Patient  recently diagnosed with COVID-19 on 10/28/2020 and has had increasing shortness of breath since.  Patient was seen in the emergency department on 11/3 continued shortness of breath and was discharged with Augmentin, doxycycline.  Patient reports continued shortness of breath, cough over the next several days.  Upon presentation SpO2 86% on room air, no leukocytosis, negative procalcitonin, negative CRP, elevated D-dimer.  Patient was initially started on antibiotics which were discontinued in setting of no leukocytosis, negative procalcitonin and afebrile.  Patient demonstrated spontaneous improvement with de-escalation in supplemental oxygen.  On day of discharge patient was ambulating with no desaturations maintaining SPO2 greater than 92%.  Patient was determined to be satisfactory for discharge with appropriate follow-up.  Patient was instructed to self isolate for 2 weeks to which she expressed understanding.      Therefore, she is discharged in fair and stable condition to home with close outpatient follow-up.    The patient met 2-midnight criteria for an inpatient stay at the time of discharge.    Discharge Date  11/08/2020     FOLLOW UP ITEMS POST DISCHARGE  Follow-up with PCP in 3-5 days for medication reconciliation and post hospital follow-up    DISCHARGE DIAGNOSES  Principal Problem:    Acute respiratory failure (HCC) POA: Unknown  Active Problems:    Pneumonia due to COVID-19  virus POA: Unknown    Hypokalemia POA: Unknown      Overview: Follow-up magnesium and repeat chemistry    DVT prophylaxis POA: Unknown  Resolved Problems:    * No resolved hospital problems. *      FOLLOW UP  No future appointments.  No follow-up provider specified.    MEDICATIONS ON DISCHARGE     Medication List      START taking these medications      Instructions   dexamethasone 6 MG Tabs  Start taking on: November 9, 2020  Commonly known as: DECADRON   Take 1 Tab by mouth every day.  Dose: 6 mg     guaiFENesin  MG Tb12  Commonly known as: MUCINEX   Take 1 Tab by mouth 2 times a day as needed for Cough (productive cough).  Dose: 600 mg        CONTINUE taking these medications      Instructions   albuterol 108 (90 Base) MCG/ACT Aers inhalation aerosol   Inhale 2 Puffs by mouth every 6 hours as needed for Shortness of Breath.  Dose: 2 Puff        STOP taking these medications    amoxicillin-clavulanate 875-125 MG Tabs  Commonly known as: Augmentin     doxycycline 100 MG capsule  Commonly known as: MONODOX            Allergies  No Known Allergies    DIET  Orders Placed This Encounter   Procedures   • Diet Order Diet: Regular     Standing Status:   Standing     Number of Occurrences:   1     Order Specific Question:   Diet:     Answer:   Regular [1]       ACTIVITY  As tolerated.  Weight bearing as tolerated    CONSULTATIONS  None    PROCEDURES  None    LABORATORY  Lab Results   Component Value Date    SODIUM 141 11/08/2020    POTASSIUM 4.1 11/08/2020    CHLORIDE 106 11/08/2020    CO2 28 11/08/2020    GLUCOSE 111 (H) 11/08/2020    BUN 16 11/08/2020    CREATININE 0.60 11/08/2020        Lab Results   Component Value Date    WBC 7.1 11/08/2020    HEMOGLOBIN 13.6 11/08/2020    HEMATOCRIT 41.3 11/08/2020    PLATELETCT 327 11/08/2020        Total time of the discharge process exceeds 39 minutes.

## 2020-11-08 NOTE — DISCHARGE INSTRUCTIONS
COVID-19  COVID-19  La COVID-19 es jake infección respiratoria causada por un virus llamado coronavirus tipo 2 causante del síndrome respiratorio александр grave (SARS-CoV-2). La enfermedad también se conoce federico enfermedad por coronavirus o nuevo coronavirus. En algunas personas, el virus puede no ocasionar síntomas. En otras, puede producir jake infección grave. La infección puede empeorar rápidamente y causar complicaciones, federico:  · Neumonía o infección en los pulmones.  · Síndrome de dificultad respiratoria aguda o SDRA. Se trata de la acumulación de líquido en los pulmones.  · Insuficiencia respiratoria aguda. Se trata de jake afección en la que no pasa suficiente oxígeno de los pulmones al cuerpo.  · Sepsis o choque séptico. Se trata de jake reacción grave del cuerpo ante jake infección.  · Problemas de coagulación.  · Infecciones secundarias debido a bacterias u hongos.  El virus que causa la COVID-19 es contagioso. [place] significa que puede transmitirse de jake persona a otra a través de las gotitas de saliva de la tos y de los estornudos (secreciones respiratorias).  ¿Cuáles son las causas?  Esta enfermedad es causada por un virus. Usted puede contagiarse con vasile virus:  · Al aspirar las gotitas que jake persona infectada elimina al toser o estornudar.  · Al tocar algo, federico jake chen o el picaportes de jake flo, que estuvo expuesto al virus (contaminado) y luego tocarse la boca, nariz o los ojos.  ¿Qué incrementa el riesgo?  Riesgo de infección  Es más probable que se infecte con vasile virus si:  · Vive o viaja a jake jovita donde hay un brote de COVID-19.  · Entra en contacto con jake persona enferma que recientemente viajó a jake jovita con un brote de COVID-19.  · Cuida o vive con jake persona infectada con COVID-19.  Riesgo de enfermedad grave  Es más probable que se enferme gravemente por el virus si:  · Tiene 65 años o más.  · Tiene jake enfermedad crónica que disminuye la capacidad del cuerpo para combatir las  infecciones (immunocomprometido).  · Vive en un hogar de ancianos o centro de atención a jennifer plazo.  · Tiene jake enfermedad prolongada (crónica), federico las siguientes:  ? Enfermedad pulmonar crónica, que incluye la enfermedad pulmonar obstructiva crónica o asma.  ? Enfermedad cardíaca.  ? Diabetes.  ? Enfermedad renal crónica.  ? Enfermedad hepática.  · Es trinidad.  ¿Cuáles son los signos o síntomas?  Los síntomas de esta afección pueden ser de leves a graves. Los síntomas pueden aparecer en el término de 2 a 14 días después de branden estado expuesto al virus. Incluyen los siguientes:  · Fiebre.  · Tos.  · Dificultad para respirar.  · Escalofríos.  · Nimo musculares.  · Dolor de garganta.  · Pérdida del gusto o el olfato.  Algunas personas también pueden tener problemas estomacales, federico náuseas, vómitos o diarrea.  Es posible que otras personas no tengan síntomas de COVID-19.  ¿Cómo se diagnostica?  Esta afección se puede diagnosticar en función de lo siguiente:  · Kasie signos y síntomas, especialmente si:  ? Vive en jake jovita donde hay un brote de COVID-19.  ? Viajó recientemente a jake jovita donde el virus es frecuente.  ? Cuida o vive con jake persona a quien se le diagnosticó COVID-19.  · Un examen físico.  · Análisis de laboratorio que pueden incluir:  ? Un hisopado nasal para jama jake muestra de líquido de la nariz.  ? Un hisopado de garganta para jama jake muestra de líquido de la garganta.  ? Jake muestra de mucosidad de los pulmones (esputo).  ? Análisis de desire.  · Los estudios de diagnóstico por imágenes pueden incluir radiografías, exploración por tomografía computarizada (TC) o ecografía.  ¿Cómo se trata?  En vasile momento, no hay ningún medicamento para tratar la COVID-19. Los medicamentos para tratar otras enfermedades se usan a modo de ensayo para comprobar si son eficaces contra la COVID-19.  El médico le informará sobre las maneras de tratar los síntomas. En la mayoría de las personas, la infección  es leve y puede controlarse en el hogar con reposo, líquidos y medicamentos de venta cricket.  El tratamiento para jake infección grave suele realizarse en la unidad de cuidados intensivos (UCI) de un hospital. Puede incluir heriberto o más de los siguientes. Estos tratamientos se administran hasta que los síntomas mejoran.  · Recibir líquidos y medicamentos a través de jake vía intravenosa.  · Oxígeno complementario. Para administrar oxígeno extra, se utiliza un tubo en la nariz, jake mascarilla o jake meagan de oxígeno.  · Colocarlo para que se recueste boca abajo (decúbito prono). [place] facilita el ingreso de oxígeno a los pulmones.  · Uso continuo de jake máquina de presión positiva de las vías aéreas (CPAP) o de presión positiva de las vías aéreas de dos niveles (BPAP). Vasile tratamiento utiliza jake presión de aire leve para mantener las vías respiratorias abiertas. Un tubo conectado a un motor administra oxígeno al cuerpo.  · Respirador. Vasile tratamiento mueve el aire dentro y fuera de los pulmones mediante el uso de un tubo que se coloca en la tráquea.  · Traqueostomía. En vasile procedimiento se hace un orificio en el rosita para insertar un tubo de respiración.  · Oxigenación por membrana extracorpórea (OMEC). En vasile procedimiento, los pulmones tienen la posibilidad de recuperarse al asumir las funciones del corazón y los pulmones. Suministra oxígeno al cuerpo y elimina el dióxido de carbono.  Siga estas instrucciones en cardoza casa:  Estilo de noemi  · Si está enfermo, quédese en cardoza casa, excepto para obtener atención médica. El médico le indicará cuánto tiempo debe quedarse en casa. Llame al médico antes de buscar atención médica.  · Claudia reposo en cardoza casa federico se lo haya indicado el médico.  · No consuma ningún producto que contenga nicotina o tabaco, federico cigarrillos, cigarrillos electrónicos y tabaco de mascar. Si necesita ayuda para dejar de fumar, consulte al médico.  · Retome danni actividades normales federico se lo haya  indicado el médico. Pregúntele al médico qué actividades son seguras para usted.  Instrucciones generales  · Use los medicamentos de venta cricket y los recetados solamente federico se lo haya indicado el médico.  · Benita suficiente líquido federico para mantener la orina de color amarillo pálido.  · Concurra a todas las visitas de seguimiento federico se lo haya indicado el médico. Costilla es importante.  ¿Cómo se sebastian?    No hay ninguna vacuna que ayude a prevenir la infección por la COVID-19. Sin embargo, hay medidas que puede jama para protegerse y proteger a otras personas de vasile virus.  Para protegerse:   · No viaje a zonas donde la COVID-19 sea un riesgo. Las zonas donde se informa la presencia de la COVID-19 cambian con frecuencia. Para identificar las zonas de alto riesgo y las restricciones de viaje, consulte el sitio web de viajes de los Centers for Disease Control and Prevention (CDC) (Centros para el Control y la Prevención de Enfermedades): wwwnc.cdc.gov/travel/notices  · Si vive o debe viajar a jake jovita donde COVID-19 es un riesgo, tome precauciones para evitar infecciones.  ? Aléjese de las personas enfermas.  ? Lávese las lei frecuentemente con agua y jabón zulema 20 segundos. Use desinfectante para lei con alcohol si no dispone de agua y jabón.  ? Evite tocarse la boca, la whit, los ojos o la nariz.  ? Evite salir de cardoza casa, siga las indicaciones de cardoza estado y de las autoridades sanitarias locales.  ? Si debe salir de cardoza casa, use un barbijo de malcolm o jake mascarilla facial.  ? Desinfecte los objetos y las superficies que se tocan con frecuencia todos los días. Pueden incluir:  § Encimeras y mesas.  § Picaportes e interruptores de lissy.  § Lavabos, fregaderos y grifos.  § Aparatos electrónicos tales federico teléfonos, controles remotos, teclados, computadoras y tabletas.  Cómo proteger a los demás:  Si tiene síntomas de la COVID-19, tome medidas para evitar que el virus se propague a otras personas.  · Si leesa  que tiene jake infección por la COVID-19, comuníquese de inmediato con cardoza médico. Informe al equipo de atención médica que leesa que puede tener jake infección por la COVID-19.  · Quédese en cardoza casa. Salga de cardoza casa solo para buscar atención médica. No utilice el transporte público.  · No viaje mientras esté enfermo.  · Lávese las lei frecuentemente con agua y jabón zulema 20 segundos. Usar desinfectante para lei con alcohol si no dispone de agua y jabón.  · Manténgase alejado de quienes vivan con usted. Permita que los miembros de la aditi sanos cuiden a los niños y las mascotas, si es posible. Si tiene que cuidar a los niños o las mascotas, lávese las lei con frecuencia y use un barbijo. Si es posible, permanezca en cardoza habitación, separado de los demás. Utilice un baño diferente.  · Asegúrese de que todas las personas que viven en cardoza casa se laven hugo las lei y con frecuencia.  · Tosa o estornude en un pañuelo de papel o sobre cardoza manga o codo. No tosa o estornude al aire ni se cubra la boca o la nariz con la mano.  · Use un barbijo de malcolm o jake mascarilla facial.  Dónde buscar más información  · Centers for Disease Control and Prevention (Centros para el Control y la Prevención de Enfermedades): www.cdc.gov/coronavirus/2019-ncov/index.html  · World Health Organization (Organización Mundial de la Kayce): www.who.int/health-topics/coronavirus  Comuníquese con un médico si:  · Vive o ha viajado a jake jovita donde la COVID-19 es un riesgo y tiene síntomas de infección.  · Ha tenido contacto con alguien que tiene COVID-19 y usted tiene síntomas de infección.  Solicite ayuda de inmediato si:  · Tiene dificultad para respirar.  · Siente dolor u opresión en el pecho.  · Experimenta confusión.  · Tiene las uñas de los dedos y los labios de color azulado.  · Tiene dificultad para despertarse.  · Los síntomas empeoran.  Estos síntomas pueden representar un problema grave que constituye jake emergencia. No espere a  mansi si los síntomas desaparecen. Solicite atención médica de inmediato. Comuníquese con el servicio de emergencias de cardoza localidad (911 en los [place]). No conduzca por danni propios medios hasta el hospital. Informe al personal médico de emergencias si leesa que tiene COVID-19.  Resumen  · La COVID-19 es jake infección respiratoria causada por un virus. También se conoce federico enfermedad por coronavirus o nuevo coronavirus. Puede causar infecciones graves, federico neumonía, síndrome de dificultad respiratoria aguda, insuficiencia respiratoria aguda o sepsis.  · El virus que causa la COVID-19 es contagioso. [place] significa que puede transmitirse de jake persona a otra a través de las gotitas de saliva de la tos y de los estornudos.  · Es más probable que desarrolle jake enfermedad grave si tiene 65 años o más, tiene un sistema inmunitario débil, vive en un hogar de ancianos o tiene enfermedad crónica.  · No hay ningún medicamento para tratar la COVID-19. El médico le informará sobre las maneras de tratar los síntomas.  · [place] medidas para protegerse y proteger a los demás contra las infecciones. Lávese las lei con frecuencia y desinfecte los objetos y las superficies que se tocan con frecuencia todos los días. Manténgase alejado de las personas que estén enfermas y use un barbijo si está enfermo.  Esta información no tiene federico fin reemplazar el consejo del médico. Asegúrese de hacerle al médico cualquier pregunta que tenga.  Document Released: 02/15/2020 Document Revised: 05/19/2020 Document Reviewed: 02/15/2020  Elsevier Patient Education © 2020 Elsevier Inc.  INSTRUCCIONES PARA LA COVID-19 O CUALQUIER OTRA  ENFERMEDAD RESPIRATORIA INFECCIOSA    Los Centros para el Control y la Prevención de Enfermedades (Centers for Disease Control and Prevention, CDC) señalan que los primeros signos de la COVID-19 incluyen tos, falta de aire, dificultad para respirar o al menos dos de los siguientes síntomas: escalofríos, temblor  con escalofríos, dolor muscular, dolor de jay, dolor de garganta y pérdida del gusto o del olfato. Los síntomas pueden ser de leves a graves y pueden manifestarse hasta dos semanas después de la exposición al virus.     La práctica del autoaislamiento y la cuarentena ayuda a proteger tanto al público federico a cardoza aditi al evitar la exposición a personas que tienen o podrían tener jake enfermedad contagiosa. Siga las siguientes medidas de prevención conforme a las pautas de los CDC:    CUÁNDO INTERRUMPIR EL AISLAMIENTO: Las personas con la COVID-19 o  cualquier otra enfermedad respiratoria infecciosa que presenten síntomas y que hayan recibido indicaciones de cuidarse en cardoza propio hogar podrán interrumpir el aislamiento domiciliario si se cumplen las siguientes condiciones:  · [place] transcurrido al menos 1 día (24 horas) desde la recuperación, definida federico la desaparición de la fiebre sin el uso de medicamentos destinados a reducirla; Y  · [place] neto los síntomas respiratorios (por ejemplo, tos, falta de aire); Y  · [place] transcurrido al menos 10 días desde que aparecieron los síntomas por primera vez y no calderón habido ninguna enfermedad subsiguiente.    CONTROLE KEYON SÍNTOMAS: Si cardoza enfermedad está empeorando, busque atención médica inmediata. Si tiene jake emergencia médica y necesita llamar al 911, notifique al personal de despacho que tiene o que lo están evaluando por tratarse de un kitty sospechoso o confirmado de la COVID-19 u otra enfermedad respiratoria infecciosa. Use jake mascarilla si es posible.    RESTRICCIÓN DE ACTIVIDADES: Restrinja las actividades fuera de cardoza hogar,  excepto para recibir atención médica. No debe ir al trabajo, a la escuela ni a áreas públicas. Evite el uso de transporte público, transporte compartido o taxis.    CITAS MÉDICAS PROGRAMADAS: Notifique a cardoza proveedor que tiene o que lo están evaluando por tratarse de un kitty sospechoso o confirmado de la COVID-19 u otra enfermedad respiratoria  infecciosa. [place] ayudará a la oficina del proveedor de atención médica a cuidar de usted de manera moore y a evitar que otras personas se infecten o se expongan.    MASCARILLAS, cuándo usarlas: Siempre que esté fuera de cardoza casa o cerca de otras personas o mascotas. Si no puede usar jake mascarilla, mantenga jake distancia mínima de 6 pies de los demás.  ENTORNO DONDE VIVE: Permanezca en jake habitación separada de otras personas y mascotas. Si es posible, use un baño separado, pídales a otras personas que cuiden de danni mascotas y evite compartir artículos del hogar. Cualquier artículo que use debe lavarse hugo con agua y jabón. Limpie todas las superficies “de mucho uso” todos los días. Use un spray de limpieza doméstico o jake toallita, de acuerdo con las instrucciones de la etiqueta. Las superficies “de mucho uso” incluyen (entre otras cosas) mesones, mesas, pomos de jerrica, accesorios de baño, inodoros, teléfonos, teclados, tabletas y mesitas de noche.    LAVADO DE JUDITH: Lávese las judith con frecuencia con agua y jabón zulema al menos 20 segundos, especialmente después de sonarse la nariz, toser o estornudar; después de ir al baño; antes y después de interactuar con mascotas; y antes y después de las comidas o de preparar alimentos. Si las judith están visiblemente sucias, use agua y jabón. Si no hay agua y jabón disponible, use un desinfectante para judith a base de alcohol con al menos 60 % de alcohol. Evite tocarse los ojos, la nariz y la boca antes de lavarse las judith. Cúbrase la boca y la nariz con un pañuelo cuando estornude o tosa. Tire los pañuelos usados en un bote de basura con bolsa. Lávese las judith inmediatamente.    AUTOCONTROL ACTIVO/FACILITADO: Siga las instrucciones proporcionadas por el departamento de rupesh o los profesionales médicos a nivel local, según corresponda.  Cuando trabaje con cardoza departamento de rupesh local, verifique danni horarios de disponibilidad.    CONDADO NÚMERO DE TELÉFONO    [facility] [phone]   [facility] [phone]   [facility] [address] [place], [place] [address] [phone]     SI TIENE UN RESULTADO POSITIVO CONFIRMADO DE LA COVID-19:  Las personas que se hayan recuperado por completo de la COVID-19 pueden tener anticuerpos en cardoza plasma (denominado “plasma convaleciente”) que generan jake respuesta inmune, y esto podría usarse para tratar a pacientes gravemente enfermos con COVID-19.  [facility] está entusiasmado por comenzar a trabajar con [employer] en la recolección de plasma convaleciente de personas que se hilliard recuperado de la COVID-19 federico parte de un programa para tratar a pacientes infectados con el virus. Vasile “fármaco nuevo en fase de investigación clínica de emergencia” aprobado por la Administración de Alimentos y Medicamentos (Food and Drug Administration, FDA) es un producto  hemoderivado especial con anticuerpos que pueden brindar a los pacientes un refuerzo adicional para combatir el virus.    Para ser elegible para donar plasma convaleciente, debe branden tenido un diagnóstico previo de COVID-19 documentado a partir de jake prueba de laboratorio (o un resultado positivo de anticuerpos de SARS-CoV-2) y cumplir con requisitos de elegibilidad adicionales.  Si le interesa donar plasma convaleciente o tiene preguntas, comuníquese con el coordinador del proyecto Plasma Convaleciente de Adilene hernandez [phone] o por correo electrónico a [email or web address]: Cómo protegerse y proteger a los demás  COVID-19: How to Protect Yourself and Others  Sepa cómo se propaga  · Actualmente, no existe ninguna vacuna para prevenir la enfermedad por coronavirus 2019 (COVID-19).  · La mejor forma de prevenir la enfermedad es evitar exponerse a vasile virus.  · Se leesa que el virus se transmite principalmente de jake persona a otra.  ? Entre las personas que están en contacto directo entre sí (a jake distancia inferior a 6 pies [1.80 m]).  ? A través de las  gotitas respiratorias producidas cuando jake persona infectada tose, estornuda o habla.  ? Estas gotitas pueden caer en la boca o en la nariz de las personas que están cerca o pueden ser inhaladas hacia los pulmones.  ? Algunos estudios recientes sugieren que la COVID-19 puede ser transmitida por personas que no presentan síntomas.  Lo que todos deben hacer  Límpiese las lei con frecuencia  · Lávese las lei con frecuencia con agua y jabón zulema al menos 20 segundos, especialmente después de branden estado en un lugar público o después de sonarse la nariz, toser o estornudar.  · Si no dispone de agua y jabón, use un desinfectante de lei que contenga al menos un 60 % de alcohol. Cubra todas las superficies de las lei y frótelas hasta que se sientan secas.  · No se toque los ojos, la nariz y la boca sin antes lavarse las lei.  Evite el contacto cercano  · Quédese en casa si está enfermo.  · Evite el contacto cercano con personas que estén enfermas.  · Establezca distancia entre usted y otras personas.  ? Recuerde que algunas personas que no tienen síntomas pueden transmitir el virus.  ? [place] es especialmente importante para las personas que tienen más riesgo de enfermarse.www.cdc.gov/coronavirus/2019-ncov/need-extra-precautions/people-at-higher-risk.html  Cúbrase la boca y la nariz con un barbijo de malcolm cuando esté cerca de otras personas  · Puede transmitir la COVID-19 a otras personas aunque no se sienta enfermo.  · Todas las personas deben usar un barbijo de malcolm cuando tengan que ir a un lugar público, por ejemplo, al supermercado o a buscar otros productos necesarios.  ? Los barbijos de malcolm no deben colocarse a niños menores de 2 años de edad, a las personas que tienen problemas respiratorios o que estén inconscientes, incapacitadas o que por algún motivo no puedan quitarse la mascarilla sin ayuda.  · El propósito del barbijo de malcolm es proteger a otras personas en kitty de que usted esté  infectado.  · NO utilice las mascarillas destinadas a los trabajadores de la rupesh.  · Continúe manteniendo jake distancia aproximada de 6 pies (1.80 m) entre usted y otras personas. El barbijo de malcolm no reemplaza el distanciamiento social.  Cúbrase al toser y estornudar  · Si está en un ambiente privado y no tiene el barbijo de malcolm, recuerde siempre cubrirse la boca y la nariz con un pañuelo descartable al toser o estornudar, o usar el pliegue del codo.  · Deseche los pañuelos descartables usados en la basura.  · Inmediatamente, lávese las lei con agua y jabón zulema al menos 20 segundos. Si no dispone de agua y jabón, límpiese las lei con un desinfectante de lei que contenga al menos un 60 % de alcohol.  Limpie y desinfecte  · Limpie Y desinfecte las superficies que se tocan con frecuencia todos los días. [place] incluye mesas, picaportes, interruptores de lissy, encimeras, mangos, escritorios, teléfonos, teclados, inodoros, grifos y lavabos. www.cdc.gov/coronavirus/2019-ncov/prevent-getting-sick/disinfecting-your-home.html  · Si las superficies están sucias, límpielas: Use detergente o jabón y agua antes de la desinfección.  · Luego, use un desinfectante doméstico. Puede consultar jake lista de los desinfectantes domésticos registrados en la Environmental Protection Agency (EPA) (Agencia de Protección Ambiental) aquí.  cdc.gov/coronavirus  05/05/2020  Esta información no tiene federico fin reemplazar el consejo del médico. Asegúrese de hacerle al médico cualquier pregunta que tenga.  Document Released: 04/23/2020 Document Revised: 05/19/2020 Document Reviewed: 04/14/2020  Elsevier Patient Education © 2020 Elsevier Inc.  Preguntas frecuentes sobre el COVID-19  COVID-19 Frequently Asked Questions  El COVID-19 (enfermedad por coronavirus) es jake infección causada por jake gran aditi de virus. Algunos virus causan enfermedades en las personas y otros causan enfermedades en animales tales federico los camellos, los gatos y  los murciélagos. En algunos casos, los virus que causan enfermedades en los animales pueden transmitirse a los seres humanos.  ¿De dónde provino el coronavirus?  En diciembre de 2019, [place] le informó a la Organización Mundial de la Kayce (OMS) acerca de varios casos de enfermedad pulmonar (enfermedad respiratoria humana). Estos casos estaban vinculados con un lamas abierto de vaishali de mar y ganado en la ciudad [facility]. El vínculo con el lamas de ganado y mariscos sugiere que el virus puede haberse propagado de los animales a los humanos. Sin embargo, desde afsaneh primer brote en diciembre, también se ha demostrado que el virus se contagia de jake persona a otra.  ¿Cuál es el nombre de la enfermedad y del virus?  Nombre de la enfermedad  Al principio, esta enfermedad se llamó nuevo coronavirus. [place] se debe a que los científicos determinaron que la enfermedad era causada por un nuevo virus respiratorio. La Organización Mundial de la [place] (OMS) ahora ha dado a la enfermedad el nombre de COVID-19, o enfermedad por coronavirus.  Nombre del virus  El virus causante de la enfermedad se conoce federico coronavirus de tipo 2 causante del síndrome respiratorio александр grave (SARS-CoV-2).  Más información sobre el nombre de la enfermedad y el virus  Organización Mundial de la [place]ce (OMS): www.who.int/emergencies/diseases/novel-coronavirus-2019/technical-guidance/naming-the-coronavirus-disease-(covid-2019)-and-the-virus-that-causes-it  ¿Quiénes están en riesgo de sufrir complicaciones debido a la enfermedad por coronavirus?  Algunas personas pueden tener un riesgo más alto de tener complicaciones debido a la enfermedad por coronavirus. Entre ellas se encuentran los adultos mayores y las personas que tienen enfermedades crónicas, federico enfermedad cardíaca, diabetes y enfermedad pulmonar.  Si tiene un riesgo más alto de tener complicaciones, tome estas precauciones adicionales:  · Evitar el contacto cercano con personas que estén  enfermas o que tengan fiebre o tos. Permanecer al menos a jake distancia de 3 a 6 pies (1-2 m) de las otras personas, si es posible.  · Lavarse las lei regularmente con agua y jabón zulema al menos 20 segundos.  · Evitar tocarse la whit, la boca, la nariz y los ojos.  · Tener a mano los suministros en cardoza casa, federico alimentos, medicamentos y productos de limpieza.  · Permanecer en cardoza casa todo lo que sea posible.  · Evitar las reuniones sociales y los viajes.  ¿Cómo se transmite la enfermedad causada por el coronavirus?  El virus que causa la enfermedad por coronavirus se transmite fácilmente de jake persona a otra (es contagioso). También hay casos de enfermedad de transmisión comunitaria. [place] significa que la enfermedad se ha propagado a:  · Personas que no tienen contacto conocido con otras personas infectadas.  · Personas que no hilliard viajado a zonas donde hay casos conocidos.  Aparentemente, se transmite de jake persona a otra a través de las gotitas que se despiden al toser o al estornudar.  ¿Puedo contraer al virus al tocar superficies u objetos?  Todavía hay mucho que no se conoce acerca del virus que causa la enfermedad por coronavirus. Los científicos basan gran parte de la información en lo que saben sobre virus similares, por ejemplo:  · En general, los virus no sobreviven en superficies zulema mucho tiempo. Necesitan un cuerpo humano (huésped) para sobrevivir.  · Es más probable que el virus se contagie por contacto cercano con personas que están enfermas (contacto directo), por ejemplo:  ? Al estrechar las lei o abrazarse.  ? Al inhalar las gotitas respiratorias que se desplazan por el aire. [place] puede ocurrir cuando jake persona infectada tose o estornuda sobre o cerca de otras personas.  · Es menos probable que el virus se propague cuando jake persona toca jake superficie o un objeto sobre el que está el virus (contacto indirecto). El virus puede ingresar al cuerpo si la persona toca jake superficie o  un objeto y luego se toca la whit, los ojos, la nariz o la boca.  ¿Jake persona puede contagiar el virus sin tener síntomas de la enfermedad?  Puede ser posible que el virus se contagie antes de que la persona tenga síntomas de la enfermedad, reggie muy probablemente esta no sea la principal forma en que el virus se está propagando. Es más probable que el virus se propague al estar en contacto directo con personas que están enfermas e inhalar las gotas respiratorias que jake persona enferma despide al toser o estornudar.  ¿Cuáles son los síntomas de la enfermedad causada por el coronavirus?  Los síntomas varían de jake persona a otra y pueden variar de leves a graves. Entre los síntomas, se pueden incluir los siguientes:  · Fiebre.  · Tos.  · Cansancio, debilidad o fatiga.  · Respiración rápida o sensación de falta el aire.  Estos síntomas pueden aparecer en el término de 2 a 14 días después de branden estado expuesto al virus. Si presenta síntomas, llame al médico. Las personas con síntomas graves pueden necesitar atención hospitalaria.  Si estoy expuesto al virus, ¿cuánto tiempo tardan en aparecer los síntomas?  Los síntomas de la enfermedad por coronavirus pueden aparecer en cualquier momento en el término de 2 a 14 días después de que jake persona haya estado expuesta al virus. Si presenta síntomas, llame al médico.  ¿Chelle hacerme un análisis de detección del virus?  El médico decidirá si debe realizarse un análisis en función de danni síntomas, antecedentes de exposición y factores de riesgo.  ¿Cómo realiza el médico el análisis para detectar vasile virus?  Los médicos obtienen muestras para enviar a analizar. Estas muestras pueden incluir lo siguiente:  · Aditya con un hisopo líquido de la nariz.  · Pedirle que tosa mucosidad (esputo) para extraer líquido de los pulmones en un recipiente estéril.  · Aditya jake muestra de desire.  · Daitya jake muestra de heces u orina.  ¿Hay algún tratamiento o vacuna para vasile  virus?  Actualmente, no existe ninguna vacuna para prevenir la enfermedad por coronavirus. Además, no existen medicamentos federico los antibióticos o los antivirales para tratar el virus. Jake persona que se enferma recibe tratamiento de apoyo, lo que significa reposo y líquidos. Jake persona también puede aliviar danni síntomas con medicamentos de venta cricket para tratar los estornudos, la tos y el goteo nasal. Son los mismos medicamentos que se nedra para el resfrío común.  Si presenta síntomas, llame al médico. Las personas con síntomas graves pueden necesitar atención hospitalaria.  ¿Qué puedo hacer para protegerme y proteger a mi aditi de vasile virus?         Puede protegerse y proteger a cardoza aditi tomando las mismas medidas que tomaría para prevenir el contagio de otros virus. [place] las siguientes medidas:  · Lavarse las lei regularmente con agua y jabón zulema al menos 20 segundos. Usar desinfectante para lei con alcohol si no dispone de agua y jabón.  · Evitar tocarse la whit, la boca, la nariz y los ojos.  · Toser o estornudar en un pañuelo descartable, sobre cardoza manga o codo. No toser o estornudar al aire ni cubrirse con la mano.  ? Si tose o estornuda en un pañuelo de papel, deséchelo inmediatamente y lávese las lei.  · Desinfectar los objetos y las superficies que se tocan con frecuencia todos los días.  · Evitar el contacto cercano con personas que estén enfermas o que tengan fiebre o tos. Permanecer al menos a jake distancia de 3 a 6 pies (1-2 m) de las otras personas, si es posible.  · Quedarse en cardoza casa si está enfermo, excepto para obtener atención médica. Llame al médico antes de buscar atención médica.  · Asegúrese de tener las vacunas al día. Pregúntele al médico qué vacunas necesita.  ¿Qué андрей hacer si tengo que viajar?  Siga las recomendaciones relacionadas con los viajes de la autoridad de rupesh local, los CDC y la OMS.  Información y consejos para viajeros  · Centers for Disease Control and  Prevention (CDC) (Centros para el Control y la Prevención de Enfermedades): www.cdc.gov/coronavirus/2019-ncov/travelers/index.html  · Organización Mundial de la Rupesh (OMS): www.who.int/emergencies/diseases/novel-coronavirus-2019/travel-advice  Conozca los riesgos y tome medidas para proteger cardoza rupesh  · El riesgo de contraer la enfermedad por coronavirus es más alto si viaja a zonas con un brote o si está en contacto con viajeros que provienen de zonas donde hay un brote.  · Lávese las lei con frecuencia y mantenga jake higiene adecuada para reducir el riesgo de contagiarse o transmitir el virus.  ¿Qué андрей hacer si estoy enfermo?  Instrucciones generales para detener la propagación de la infección  · Lavarse las lei regularmente con agua y jabón zulema al menos 20 segundos. Usar desinfectante para lei con alcohol si no dispone de agua y jabón.  · Toser o estornudar en un pañuelo descartable, sobre cardoza manga o codo. No toser o estornudar al aire ni cubrirse con la mano.  · Si tose o estornuda en un pañuelo de papel, deséchelo inmediatamente y lávese las lei.  · Quédese en cardoza casa a menos que deba recibir atención médica. Llame al médico o a la autoridad de rupesh local antes de buscar atención médica.  · Evite las zonas públicas. No viaje en transporte público, de ser posible.  · Si puede, use un barbijo si debe salir de la casa o si está en contacto cercano con alguien que no está enfermo.  Mantenga cardoza casa limpia  · Desinfecte los objetos y las superficies que se tocan con frecuencia todos los días. Pueden incluir:  ? Encimeras y mesas.  ? Picaportes e interruptores de lissy.  ? Lavabos, fregaderos y grifos.  ? Aparatos electrónicos tales federico teléfonos, controles remotos, teclados, computadoras y tabletas.  · Lave los platos con agua jabonosa caliente o en el lavavajillas. Deje los platos para que se sequen al aire.  · Lave la ropa con [place].  Evite infectar a otros miembros de la aditi  · Permita  que los miembros de la aditi sanos cuiden a los niños y las mascotas, si es posible. Si tiene que cuidar a los niños o las mascotas, lávese las lei con frecuencia y use un barbijo.  · Duerma en jake habitación o cama diferentes, si es posible.  · No comparta elementos personales, federico afeitadoras, cepillos de dientes, desodorantes, peines, cepillos, toallas y toallitas de mano.  Dónde buscar más información  Centers for Disease Control and Prevention (CDC)  · Actualizaciones de información y novedades: www.cdc.gov/coronavirus/2019-ncov  Organización Mundial de la Rupesh (OMS)  · Actualizaciones de información y novedades: www.who.int/emergencies/diseases/novel-coronavirus-2019  · Xiomara de rupesh relacionado con el coronavirus: www.who.int/health-topics/coronavirus  · Preguntas y respuestas sobre COVID-19: www.who.int/news-room/q-a-detail/x-i-vivtxhctiwmjb  · Registro mundial: who.kelly.com  American Academy of Pediatrics (AAP) (Academia Estadounidense de Pediatría)  · Información para familias: www.healthychildren.org/English/health-issues/conditions/chest-lungs/Pages/2019-Novel-Coronavirus.aspx  La situación del coronavirus cambia rápidamente. Consulte el sitio web de cardoza autoridad de rupesh local o los sitios web de los CDC y la OMS para enterarse de las novedades y noticias.  ¿Cuándo андрей comunicarme con un médico?  · Comuníquese con cardoza médico si tiene síntomas de infección, federico fiebre o tos, y:  ? Ha estado cerca de alguien que sabe que tiene la enfermedad por coronavirus.  ? Ha estado en contacto con jake persona que presuntamente sufra de la enfermedad por coronavirus.  ? Ha viajado fuera del país.  ¿Cuándo андрей buscar asistencia médica inmediata?  · Busque ayuda de inmediato llamando al servicio de emergencias de cardoza localidad (911 en los [place]) si tiene lo siguiente:  ? Dificultad para respirar.  ? Dolor u opresión en el pecho.  ? Confusión.  ? Labios y uñas de color azulado.  ? Dificultad para  despertarse.  ? Síntomas que empeoran.  Informe al personal médico de emergencias si leesa que tiene la enfermedad por coronavirus.  Resumen  · Un nuevo virus respiratorio se propaga de jake persona a otra y causa COVID-19 (enfermedad por coronavirus).  · El virus que causa el COVID-19 parece diseminarse fácilmente. Se transmite de jake persona a otra a través de las gotitas que se despiden al toser o al estornudar.  · Los adultos mayores y las personas que tienen enfermedades crónicas tienen mayor riesgo de contraer la enfermedad. Si tiene un riesgo más alto de tener complicaciones, tome precauciones adicionales.  · Actualmente, no existe ninguna vacuna para prevenir la enfermedad por coronavirus. No existen medicamentos, federico los antibióticos o los antivirales, para tratar el virus.  · Puede protegerse y proteger a cardoza aditi al lavarse las lei con frecuencia, evitar tocarse la whit y cubrirse al toser y estornudar.  Esta información no tiene federico fin reemplazar el consejo del médico. Asegúrese de hacerle al médico cualquier pregunta que tenga.  Document Released: 04/27/2020 Document Revised: 04/27/2020 Document Reviewed: 04/27/2020  Elsevier Patient Education © 2020 Elsevier Inc.

## 2020-11-11 LAB
BACTERIA BLD CULT: NORMAL
BACTERIA BLD CULT: NORMAL
SIGNIFICANT IND 70042: NORMAL
SIGNIFICANT IND 70042: NORMAL
SITE SITE: NORMAL
SITE SITE: NORMAL
SOURCE SOURCE: NORMAL
SOURCE SOURCE: NORMAL

## 2020-12-28 ENCOUNTER — HOSPITAL ENCOUNTER (OUTPATIENT)
Dept: RADIOLOGY | Facility: MEDICAL CENTER | Age: 60
End: 2020-12-28
Attending: FAMILY MEDICINE
Payer: COMMERCIAL

## 2020-12-28 DIAGNOSIS — J12.81 PNEUMONIA DUE TO SARS-ASSOCIATED CORONAVIRUS: ICD-10-CM

## 2020-12-28 PROCEDURE — 71046 X-RAY EXAM CHEST 2 VIEWS: CPT

## 2021-02-01 ENCOUNTER — HOSPITAL ENCOUNTER (OUTPATIENT)
Dept: HOSPITAL 8 - CFH | Age: 61
Discharge: HOME | End: 2021-02-01
Attending: FAMILY MEDICINE
Payer: COMMERCIAL

## 2021-02-01 DIAGNOSIS — Z12.31: Primary | ICD-10-CM

## 2021-02-01 PROCEDURE — 77063 BREAST TOMOSYNTHESIS BI: CPT

## 2021-02-01 PROCEDURE — 77067 SCR MAMMO BI INCL CAD: CPT

## 2023-10-17 ENCOUNTER — TELEPHONE (OUTPATIENT)
Dept: HEALTH INFORMATION MANAGEMENT | Facility: OTHER | Age: 63
End: 2023-10-17

## 2023-12-22 PROBLEM — R94.30 NONSPECIFIC ABNORMAL FUNCTION STUDY, CARDIOVASCULAR: Status: ACTIVE | Noted: 2023-12-22

## 2023-12-22 PROBLEM — Z79.899 ON DEEP VEIN THROMBOSIS (DVT) PROPHYLAXIS: Status: RESOLVED | Noted: 2020-11-06 | Resolved: 2023-12-22

## 2023-12-22 PROBLEM — J96.00 ACUTE RESPIRATORY FAILURE (HCC): Status: RESOLVED | Noted: 2020-11-05 | Resolved: 2023-12-22

## 2024-01-29 ENCOUNTER — OFFICE VISIT (OUTPATIENT)
Dept: MEDICAL GROUP | Facility: PHYSICIAN GROUP | Age: 64
End: 2024-01-29
Payer: MEDICARE

## 2024-01-29 VITALS
RESPIRATION RATE: 16 BRPM | BODY MASS INDEX: 28.51 KG/M2 | HEART RATE: 92 BPM | HEIGHT: 64 IN | DIASTOLIC BLOOD PRESSURE: 78 MMHG | WEIGHT: 167 LBS | SYSTOLIC BLOOD PRESSURE: 124 MMHG | OXYGEN SATURATION: 96 % | TEMPERATURE: 97.6 F

## 2024-01-29 DIAGNOSIS — Z13.0 SCREENING FOR ENDOCRINE, METABOLIC AND IMMUNITY DISORDER: ICD-10-CM

## 2024-01-29 DIAGNOSIS — R42 VERTIGO: ICD-10-CM

## 2024-01-29 DIAGNOSIS — K21.9 GASTROESOPHAGEAL REFLUX DISEASE WITHOUT ESOPHAGITIS: ICD-10-CM

## 2024-01-29 DIAGNOSIS — Z78.0 POSTMENOPAUSAL: ICD-10-CM

## 2024-01-29 DIAGNOSIS — E55.9 VITAMIN D DEFICIENCY: ICD-10-CM

## 2024-01-29 DIAGNOSIS — Z23 NEED FOR VACCINATION: ICD-10-CM

## 2024-01-29 DIAGNOSIS — R25.1 TREMOR: ICD-10-CM

## 2024-01-29 DIAGNOSIS — Z13.29 SCREENING FOR ENDOCRINE, METABOLIC AND IMMUNITY DISORDER: ICD-10-CM

## 2024-01-29 DIAGNOSIS — Z13.228 SCREENING FOR ENDOCRINE, METABOLIC AND IMMUNITY DISORDER: ICD-10-CM

## 2024-01-29 DIAGNOSIS — Z13.6 SCREENING FOR CARDIOVASCULAR CONDITION: ICD-10-CM

## 2024-01-29 PROBLEM — J12.82 PNEUMONIA DUE TO COVID-19 VIRUS: Status: RESOLVED | Noted: 2020-11-05 | Resolved: 2024-01-29

## 2024-01-29 PROBLEM — U07.1 PNEUMONIA DUE TO COVID-19 VIRUS: Status: RESOLVED | Noted: 2020-11-05 | Resolved: 2024-01-29

## 2024-01-29 PROCEDURE — 3078F DIAST BP <80 MM HG: CPT

## 2024-01-29 PROCEDURE — 3074F SYST BP LT 130 MM HG: CPT

## 2024-01-29 PROCEDURE — G0008 ADMIN INFLUENZA VIRUS VAC: HCPCS

## 2024-01-29 PROCEDURE — 90662 IIV NO PRSV INCREASED AG IM: CPT

## 2024-01-29 PROCEDURE — 99204 OFFICE O/P NEW MOD 45 MIN: CPT | Mod: 25

## 2024-01-29 RX ORDER — OMEPRAZOLE 40 MG/1
40 CAPSULE, DELAYED RELEASE ORAL DAILY
Qty: 30 CAPSULE | Refills: 1 | Status: SHIPPED | OUTPATIENT
Start: 2024-01-29

## 2024-01-29 ASSESSMENT — ENCOUNTER SYMPTOMS
WHEEZING: 0
CONSTIPATION: 0
COUGH: 0
FEVER: 0
TINGLING: 0
WEIGHT LOSS: 0
HEADACHES: 0
NAUSEA: 0
PALPITATIONS: 0
ABDOMINAL PAIN: 0
SHORTNESS OF BREATH: 0
CHILLS: 0
VOMITING: 0
DIARRHEA: 0
BLOOD IN STOOL: 0
DIZZINESS: 0

## 2024-01-29 ASSESSMENT — PATIENT HEALTH QUESTIONNAIRE - PHQ9: CLINICAL INTERPRETATION OF PHQ2 SCORE: 0

## 2024-01-29 NOTE — PROGRESS NOTES
Subjective:     Chief Complaint   Patient presents with    Establish Care    Shaking     On left hand x 1 year, head x 3 months     Amber House is a 63 y.o. female, who is here today to establish care and discuss shaking of her left hand for the last year as well as her head more recently. Her prior PCP was Dr Robbie Poe.    Interpretor Name: Luis Carlos  Interpretor ID: [id number]  Patient preferred language used: Croatian    Problem   Tremor    Chronic problem  Patient reports a shaky left hand or tremor of the left hand while picking up objects.  Denies tremor at rest or loss of function with this hand.  Denies any numbness or tingling or recent injury to the affected extremity.  Patient's right hand has not shown any tremor or shakiness.     Vertigo    Chronic problem.  Patient reports having vertigo in the past.  Recently in the last 3 months has noticed a slight variation to her vertigo.  Patient has never been on medications or seen physical therapy for vertigo.  Patient states that it feels like she is shaking her head yes but it is only for a moment.     Gastroesophageal Reflux Disease Without Esophagitis    Chronic problem.  Patient states that when she gets acid reflux she will take omeprazole 20 mg daily as needed, but has not had much relief. Does not typically avoid specific foods.     Pneumonia Due to Covid-19 Virus (Resolved)    Hospitalization 2020      Allergies: Patient has no known allergies.    Current outpatient medications: None     Review of Systems   Constitutional:  Negative for chills, fever and weight loss.   Respiratory:  Negative for cough, shortness of breath and wheezing.    Cardiovascular:  Negative for chest pain, palpitations and leg swelling.   Gastrointestinal:  Negative for abdominal pain, blood in stool, constipation, diarrhea, nausea and vomiting.   Genitourinary:  Negative for hematuria.   Skin:  Negative for itching and rash.   Neurological:  Negative for dizziness,  "tingling and headaches.     Health Maintenance: Completed    Objective:     /78   Pulse 92   Temp 36.4 °C (97.6 °F) (Temporal)   Resp 16   Ht 1.626 m (5' 4\")   Wt 75.8 kg (167 lb)   SpO2 96%  Body mass index is 28.67 kg/m².    Physical Exam  Vitals reviewed.   Constitutional:       General: She is not in acute distress.     Appearance: Normal appearance. She is not ill-appearing.   Cardiovascular:      Rate and Rhythm: Normal rate and regular rhythm.      Heart sounds: Normal heart sounds.   Pulmonary:      Effort: Pulmonary effort is normal.      Breath sounds: Normal breath sounds.   Abdominal:      General: Abdomen is flat.      Palpations: Abdomen is soft.   Musculoskeletal:      Cervical back: Normal range of motion.   Skin:     General: Skin is warm and dry.   Neurological:      General: No focal deficit present.      Mental Status: She is alert and oriented to person, place, and time.   Psychiatric:         Mood and Affect: Mood normal.         Behavior: Behavior normal.         Thought Content: Thought content normal.         Judgment: Judgment normal.        Assessment & Plan:     The following plan was discussed through shared decision making with the patient:    1. Vertigo  2. Tremor  Chronic, uncontrolled.  Patient will follow-up with neurology for further evaluation of this tremor and for possible vertigo.  May consider referral to ENT for vertigo and or possible medication if this persists.  - Referral to Neurology    3. Gastroesophageal reflux disease without esophagitis  Chronic, uncontrolled.  Instructed patient to begin taking omeprazole 40 mg for the next 2 weeks.  If these symptoms resolve, she may stop taking it and use it in intervals as these symptoms flare.  - omeprazole (PRILOSEC) 40 MG delayed-release capsule; Take 1 Capsule by mouth every day.  Dispense: 30 Capsule; Refill: 1    4. Screening for endocrine, metabolic and immunity disorder  Patient presents today to establish " care and discuss chronic care management.  Patient is due for updated lab work it has been a little over a year since her last screening/lab work.  - CBC WITH DIFFERENTIAL; Future  - Comp Metabolic Panel; Future  - HEMOGLOBIN A1C; Future  - TSH WITH REFLEX TO FT4; Future    5. Screening for cardiovascular condition  See #4  - Lipid Profile; Future    6. Vitamin D deficiency  See #4  - VITAMIN D,25 HYDROXY (DEFICIENCY); Future    7. Postmenopausal  - DS-BONE DENSITY STUDY (DEXA); Future    8. Need for vaccination  - INFLUENZA VACCINE, HIGH DOSE (65+ ONLY)    Return in about 6 months (around 7/29/2024) for Labs.         I spent a total of 50 minutes with record review, exam, communication with the patient, communication with other providers, and documentation of this encounter.    Please note that this dictation was created using voice recognition software. I have made every reasonable attempt to correct obvious errors, but I expect that there are errors of grammar and possibly content that I did not discover before finalizing the note.    FUNMILAYO Roach  [facility]  [facility]

## 2024-02-01 ENCOUNTER — OFFICE VISIT (OUTPATIENT)
Dept: NEUROLOGY | Facility: MEDICAL CENTER | Age: 64
End: 2024-02-01
Attending: INTERNAL MEDICINE
Payer: MEDICARE

## 2024-02-01 VITALS
BODY MASS INDEX: 28.49 KG/M2 | SYSTOLIC BLOOD PRESSURE: 110 MMHG | OXYGEN SATURATION: 96 % | DIASTOLIC BLOOD PRESSURE: 82 MMHG | HEIGHT: 64 IN | HEART RATE: 91 BPM | RESPIRATION RATE: 16 BRPM | TEMPERATURE: 97.9 F | WEIGHT: 166.89 LBS

## 2024-02-01 DIAGNOSIS — G25.0 ESSENTIAL TREMOR: ICD-10-CM

## 2024-02-01 PROCEDURE — 99204 OFFICE O/P NEW MOD 45 MIN: CPT | Performed by: INTERNAL MEDICINE

## 2024-02-01 PROCEDURE — 3074F SYST BP LT 130 MM HG: CPT | Performed by: INTERNAL MEDICINE

## 2024-02-01 PROCEDURE — 3079F DIAST BP 80-89 MM HG: CPT | Performed by: INTERNAL MEDICINE

## 2024-02-01 PROCEDURE — 99202 OFFICE O/P NEW SF 15 MIN: CPT | Performed by: INTERNAL MEDICINE

## 2024-02-01 NOTE — PATIENT INSTRUCTIONS
"Essential tremor  - check labs: TSH (already ordered by PCP)    Sleep maintenance insomnia  - START melatonin 1 or 3 mg tablets. Take at bedtime, at a consistent time each night. Look for supplements that are just melatonin tablets, and have a label that shows USP certification. Max dose is 15mg per night.   - referral to sleep study per PCP if not improving        Essential Tremor    Tremor is an oscillatory movement produced by rhythmic contractions of muscles. Essential tremor (ET) is the most common form of tremor, affecting about 5% of people over the age 60 years. Although ET is more common in the elderly and often wrongly labeled as \"senile tremor\", it may start at any age. In contrast to rest tremor as in Parkinson's disease, ET is typically an action tremor (reaching for objects) and postural tremor (holding papers or utensils). Arm involvement is most common, followed by head, voice, then legs. ET in neck muscles can cause either \"yes-yes\" or \"no-no\" head movements. When the vocal cords are affected, the patient experiences tremulous voice while singing or talking. Despite the frequently used prefix \"benign\", ET can produce significant physical and psychosocial disability. In addition to social embarrassment, ET can interfere with writing, eating, speaking, singing, and various activities of daily living. Tremor amplitude usually increases with age and become more troublesome. Essential tremor is called “essential” because it was thought to be the single symptom of the disorder. However, patients with ET have higher rates of hearing loss, restless legs syndrome, and balance difficulties.    In some cases, tremors occur only during specific tasks, such as writing, or while holding limbs in certain position such as holding a glass to drink. These task- and position-specific tremors may be variants of ET, but can also have unique features that overlap with another movement disorder called dystonia. Dystonia " "is manifested an involuntary sustained contraction of muscles producing abnormal movements or postures. Examples of dystonia include writer's cramp, torticollis (cervical dystonia), and involuntary eye closure (blepharospasm). In some people, dystonia produces movements resembling ET. But in contrast to ET, dystonic tremor is more irregular and is more influenced by the body part's position.    What causes essential tremor?    Frequently misdiagnosed as a stress-related condition or a natural consequence of aging, ET is a manifestation of a genetic neurological disorder. Family history of tremor is present in majority of patients. Despite the overwhelming evidence that ET is a genetic disorder, the gene or genes for ET have not yet been fully identified. Although at least six gene loci for familial ET have been identified, there is currently no genetic test for ET. Imaging studies are usually normal. Recent evidence have shown loss and swelling of Purkinje cells in the cerebellum (brain area that deals with coordination), and elevated LINGO1 protein levels and BLAKE neurotransmitter dysfunction.     How is essential tremor treated?    The treatment of tremor depends largely on its severity; many patients require nothing more than simple reassurance. Physical or emotional stress can exacerbate ET, as can lack of sleep, low blood sugar, overactive thyroid, and certain drugs. Improvement can therefore be seen with medication adjustments or reduction in stimulants such as caffeine. Weighted utensils, cups, or wrist weights can also help. Since alcohol reduces the amplitude of ET in about 2/3 of patients, some patients use it regularly for its \"calming\" effect such as before an important engagement. For more bothersome tremors, the Rusty kIQ™ is a wristwatch-like device that stimulates the nerves to improve tremors on that arm for a few hours after treatment.    Propranolol (Inderal), a beta-blocker commonly used in " cardiology, remains the most common drug for the treatment of ET and enhanced physiologic tremors. Other beta blockers, especially nadolol, may also help postural tremor. The side effects of propranolol and other beta blockers include fatigue, depression, and erectile dysfunction. Primidone (Mysoline) and topiramate (Topamax) are also considered first-line treatment for ET. They are part of another class of medications originally developed for seizures and shown to be effective for ET in clinical trials. Side effects for primidone are usually felt with the initial doses and include nausea, vomiting, sedation, confusion, and worsening balance that usually resolve after a few days. Side effects for topiramate are usually experienced at higher doses, which can include brain fog, tingling around lips and fingers, or change in sense of taste. Combination of two drugs may be more efficacious than one drug alone.    Other medications such as gabapentin (Neurontin), pregabalin (Lyrica), zonisamide (Zonegran), levetiracetam (Keppra), lorazepam (Ativan), and clonazepam (Klonopin) may also improve ET and its variants. Gabapentin, topiramate, levodopa, primidone, clonazepam, and phenobarbital may be useful in patients with orthostatic tremor, a possible variant of ET.     Task specific tremors usually do not respond as well to medications. Patients with head tremor often do not respond to medications but can be effectively treated with botulinum toxin (BoNT) injections into the neck muscles. BoNT can be effective in the treatment of certain hand tremors. Usually well tolerated, BoNT can produce transient weakness of the injected muscles.    Neurosurgical treatments, such as deep brain stimulation (DBS) or focused ultrasound (magnetic resonance-guide focused ultrasound: MRgFUS), are generally reserved for patients whose tremors continue to interfere with work or activities of daily living despite optimal medical therapy. Large  "amplitude and slow frequency postural tremors usually do not respond well to pharmacologic therapy. Focused ultrasound uses MRI imaging to precisely guide the ultrasound transducer waves to heat and ablate specific parts of the brain responsible for generating tremors on one side of the body. No incision is required for this procedure. The other side can be done in another session but at the risk of more side effects such as balance difficulties or changes to voice and swallowing. Deep brain stimulation involves an incision in the scalp, drilling a hole in the skull, and advancing a thin wire into the portion of the brain that is thought to be functioning abnormally. The DBS lead contains multiple electrodes which is then fixed to the skull with a ring and cap. An extension wire passes from the scalp, under the skin at the neck, and connected to an implantable pulse generator (IPG) in the chest near the collar bone. This pacemaker-like device delivers pulses with a variety of parameters into the target brain area. The patient can activate or deactivate the DBS system with a handheld . The major advantage of DBS over ablative procedures such as focused ultrasound is that the stimulating electrodes and parameters (frequency, pulse width, and voltage) can be adjusted and \"customized\" to the needs of the individual patients. Potential surgical risks such as stroke, bleeds, or infection, are rare but should be considered before proceeding. Side effects, if they occur, are usually reversible, but may include weakness, speech and swallowing difficulties, and abnormal sensations.    Selected References  Marquis SANCHEZ, Gelacio ALMANZAR, Bharath JAEGER. Focused ultrasound and other lesioning in the treatment of tremor. Journal of the Neurological Sciences. 2022;435:656011. doi:10.1016/j.jns.2022.220392  Zachary S, Colletta K, Erickson S, et al. Real-World Evidence of Transcutaneous Afferent Patterned Stimulation for Essential Tremor. " Tremor and Other Hyperkinetic Movements. 2022;12(1):27. doi:10.5334/to.715  Jey H, Corinne LIRIANO, Jsoe MCDONOUGH. Genetics of essential tremor. Brain 2007;130:1456-64.   Darrick JJ, Nirali TA, Maggie KE, et al. MDS evidence?based review of treatments for essential tremor. Mov Disord. 2019;34(7):950-958. doi:10.1002/mds.02873  Roxanne Prabhakar. Essential Tremor. Helder LILLY, ed. N Engl J Med. 2018;378(19):5467-1579. doi:10.1056/[id number]  Jose Alcantar. Botulinum toxin in the treatment of tremors. In: Katelynn BRUNSON, Roxanne BURNHAM, Jose MCDONOUGH. Scientific and Therapeutic Aspects of Botulinum Toxin. Francoise Hermann & Dia, [place], [place], 2002:323-336.   Jose MCDONOUGH, Kayla MCDONOUGH, Kenny CRISOSTOMO. Essential tremor among children. Pediatrics 2004;114:7900-0944.   Bala ALEJANDRO, Bk L, Mynor Cox K, Lito SOLITARIO, Jose MCDONOUGH. Hearing impairment in essential tremor. Neurology 2003;61:1093-7.   Bala ALEJANDRO, Turner BURNHAM, En H.  Computerized Posturography Balance Assessments of Patients with Bilateral VIM Deep Brain Stimulation. Mov Disord 2006;21:6410-9383.  Bala LIRIANO. Task specific writing tremor: clinical phenotypes, progression, and treatment outcomes. Int J Neurosci 2012 (in press).    Appendix  International Essential Tremor Foundation  www.essentialtremor.org    International Parkinson and Movement Disorder Society  www.movementdisorders.org    National Organization for Rare Disorders (ELÍAS)  www.rarediseases.org

## 2024-02-01 NOTE — PROGRESS NOTES
[facility]  [address] [place] [address], [address]. [place] [address]  Phone: [phone] Patient Name: Amber House  : 1960  MRN: [id number]     ASSESSMENT / PLAN       Amber House is a 63 y.o. RHD female presenting for tremors.    Essential tremor  Onset in  with left hand tremors and now head tremors, worse when holding phone or objects in hands.  It is not bothersome so she is not interested in starting a medication at this time.  No evidence of postural instability, rigidity, bradykinesia consistent with parkinsonism.  - check labs: TSH (already ordered by PCP)    Sleep maintenance insomnia  - START melatonin 1 or 3 mg tablets. Take at bedtime, at a consistent time each night. Look for supplements that are just melatonin tablets, and have a label that shows USP certification. Max dose is 15mg per night.   - referral to sleep study per PCP if not improving. STOPBANG 2.      - Return if symptoms worsen or fail to improve.    Alexander Keene DO  Neurology, Movement Disorders Specialist    BILLING DOCUMENTATION:   I spent 45 minutes reviewing the medical record, interviewing and examining the patient, discussing diagnosis and treatment, and coordinating care.          HISTORY OF PRESENT ILLNESS      Amber House is a 63 y.o. RHD female presenting for tremors.    Initial HPI 24  Language Line  used    Tremor  Onset:  with the left hand at beginning. Now left hand > right hand tremor. Also head tremor that resolves when leaning or flat. Concerned this is parkinson's. Seems to have improved since initial onset. Noticeable when holding plates or holding phone. At rest, the tremor resolves.   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 No hand numbness or tingling.     No supplements. One cup of coffee in morning.     Gait: mild change, but stays active at gym and walking  Neuroleptics/pesticide exposure: no  Anosmia:  no  Family history: sister   Constipation: on occasion, will try to increase fiber in diet  Sleep/RBD: trouble staying asleep, beto past 3 days. Not on any supplements for sleep. +snoring. No dream enactment.     Imaging: none  TSH: none    Current Regimen  - none          1/29/2024     1:40 PM 12/21/2023     4:00 PM   PHQ-9 Screening   Little interest or pleasure in doing things 0 - not at all 0 - not at all   Feeling down, depressed, or hopeless 0 - not at all 0 - not at all   PHQ-2 Total Score 0 0       Interpretation of PHQ-9 Total Score   Score Severity   1-4 No Depression   5-9 Mild Depression   10-14 Moderate Depression   15-19 Moderately Severe Depression   20-27 Severe Depression     Past Medical History:   Diagnosis Date    Pneumonia due to COVID-19 virus 11/05/2020    Hospitalization 2020       Past Surgical History:   Procedure Laterality Date    BUNIONECTOMY  2023    VAGINAL HYSTERECTOMY TOTAL  2004    PRIMARY C SECTION  1987    PRIMARY C SECTION  1985    PRIMARY C SECTION  1982       Family History   Problem Relation Age of Onset    Stroke Mother     Hypertension Mother     Diabetes Mother     Hypertension Sister     Diabetes Sister     Hypertension Sister     Diabetes Sister     Diabetes Sister     Cancer Paternal Aunt         stomach       Social History     Socioeconomic History    Marital status:      Spouse name: Not on file    Number of children: Not on file    Years of education: Not on file    Highest education level: Not on file   Occupational History    Not on file   Tobacco Use    Smoking status: Never    Smokeless tobacco: Never   Vaping Use    Vaping Use: Never used   Substance and Sexual Activity    Alcohol use: Yes     Alcohol/week: 1.2 oz     Types: 2 Glasses of wine per week     Comment: occ    Drug use: Never    Sexual activity: Not Currently   Other Topics Concern    Not on file   Social History Narrative    Not on file     Social Determinants of Health     Financial Resource  "Strain: Not on file   Food Insecurity: Not on file   Transportation Needs: Not on file   Physical Activity: Not on file   Stress: Not on file   Social Connections: Not on file   Intimate Partner Violence: Not on file   Housing Stability: Not on file       Current Outpatient Medications   Medication    omeprazole (PRILOSEC) 40 MG delayed-release capsule     No current facility-administered medications for this visit.       No Known Allergies    DATA / RESULTS      No results found for: \"25HYDROXY\", \"QWJIYEVA99\", \"TSHULTRASEN\", \"SPIFINTERP\", \"FWL959\", \"CECI\", \"HBA1C\", \"LDL\"       OBJECTIVE      Vitals:    02/01/24 0912   BP: 110/82   BP Location: Left arm   Patient Position: Sitting   BP Cuff Size: Adult   Pulse: 91   Resp: 16   Temp: 36.6 °C (97.9 °F)   TempSrc: Temporal   SpO2: 96%   Weight: 75.7 kg (166 lb 14.2 oz)   Height: 1.626 m (5' 4\")     Physical Exam     General: NAD, appears stated age.      Mental status: Speech clear and fluent without tremor. Fund of knowledge is good.      Cranial Nerves:  CN2: PERRL. Visual fields are full to finger confrontation.   CN3/4/6: EOMI. There is no nystagmus.   CN5: V1-V3 intact to light touch    CN7: Symmetric face.   CN8: Hearing grossly intact.   CN9/10/12: Soft palate and uvula rise symmetrically. Tongue midline.   CN11: Shoulder shrug intact bilaterally.     Strength  Right Left   Shoulder Abduction  5 5   Elbow Flexion 5 5   Elbow Extension  5 5   Wrist Extension  5 5   Finger Extension  5 5   Hip Flexion  5 5   Knee Extension 5 5   Ankle Dorsiflexion  5 5     Deep Tendon Reflexes: 2+ and symmetric in biceps, brachioradialis, patella and ankles.     Abnormal movements:    UPDRS Right Left   Finger tapping 0 0   Hand Movement 0 0   Toe Tapping 0 0   Leg Agility 0 0   Rigidity 0 0   Rest Tremor 0 0   Postural Tremor 0 1cm when holding phone  0cm without phone   Kinetic Tremor <1cm 1cm when holding phone  0cm without phone      Right laterocollis, trace left torticollis.  " No dystonic or essential head tremor noted.  No dystonia, dyskinesias, tics, stereotypies, athetosis, akathisia, or chorea noted.      Cerebellar: No dysmetria with FTN or heel-to-shin.    Gait:   Posture - normal   Base - narrow   Stride length - normal   Arm swing - decreased right   Speed - normal  Shuffling/freezing - none  U-Turn - normal   Heel, toe, and tandem - normal     PROCEDURE     N/A

## 2024-02-15 ENCOUNTER — HOSPITAL ENCOUNTER (OUTPATIENT)
Dept: RADIOLOGY | Facility: MEDICAL CENTER | Age: 64
End: 2024-02-15
Payer: MEDICARE

## 2024-02-15 DIAGNOSIS — Z78.0 POSTMENOPAUSAL: ICD-10-CM

## 2024-02-15 PROCEDURE — 77080 DXA BONE DENSITY AXIAL: CPT

## 2024-02-16 ENCOUNTER — OFFICE VISIT (OUTPATIENT)
Dept: MEDICAL GROUP | Facility: PHYSICIAN GROUP | Age: 64
End: 2024-02-16
Payer: MEDICARE

## 2024-02-16 VITALS
HEIGHT: 64 IN | HEART RATE: 72 BPM | DIASTOLIC BLOOD PRESSURE: 78 MMHG | BODY MASS INDEX: 28.38 KG/M2 | RESPIRATION RATE: 17 BRPM | SYSTOLIC BLOOD PRESSURE: 122 MMHG | OXYGEN SATURATION: 98 % | WEIGHT: 166.2 LBS | TEMPERATURE: 97.5 F

## 2024-02-16 DIAGNOSIS — M54.50 ACUTE MIDLINE LOW BACK PAIN WITHOUT SCIATICA: ICD-10-CM

## 2024-02-16 DIAGNOSIS — M85.88 OSTEOPENIA OF LUMBAR SPINE: ICD-10-CM

## 2024-02-16 DIAGNOSIS — K21.9 GASTROESOPHAGEAL REFLUX DISEASE WITHOUT ESOPHAGITIS: ICD-10-CM

## 2024-02-16 PROCEDURE — 3074F SYST BP LT 130 MM HG: CPT

## 2024-02-16 PROCEDURE — 3078F DIAST BP <80 MM HG: CPT

## 2024-02-16 PROCEDURE — 99214 OFFICE O/P EST MOD 30 MIN: CPT

## 2024-02-16 RX ORDER — MELOXICAM 7.5 MG/1
7.5 TABLET ORAL DAILY
Qty: 30 TABLET | Refills: 1 | Status: SHIPPED | OUTPATIENT
Start: 2024-02-16

## 2024-02-16 ASSESSMENT — ENCOUNTER SYMPTOMS
COUGH: 0
VOMITING: 0
WEIGHT LOSS: 0
DIZZINESS: 0
NAUSEA: 0
HEADACHES: 0
CHILLS: 0
SHORTNESS OF BREATH: 0
PALPITATIONS: 0
WHEEZING: 0
BLOOD IN STOOL: 0
DIARRHEA: 0
FEVER: 0
TINGLING: 0
CONSTIPATION: 0
ABDOMINAL PAIN: 0

## 2024-02-16 NOTE — PROGRESS NOTES
"Subjective:     Chief Complaint   Patient presents with    Lab Results     Bone desity     Interpretor Name: Vamsi  Interpretor ID: [id number]  Patient preferred language used: Scottish    HPI: Amber presents today with  Problem   Osteopenia of Lumbar Spine    Recent DEXA scan showed osteopenia to both her left femur as well as her lumbar spine.  Currently taking Prilosec 40 mg daily.  Previously taking only 20 but with recent exacerbation of acid reflux we did increase at her last visit.  Patient reports that in the last months she began doing exercises but noted in the last couple days a acute low back pain that began.     Acute Midline Low Back Pain Without Sciatica    Patient has been experiencing low back pain without any sciatica for the last 3 days.  Patient has been taking Flanax which is a naproxen type medication as well as vitamin D.  Denies any numbness tingling in her lower extremities.     Gastroesophageal Reflux Disease Without Esophagitis    Currently taking Prilosec 40 mg daily.  Prior to her last appointment with me she was taking 20 mg daily.  Today in office we went over her bone density scan which showed osteopenia.   Patient has not completed her lab work yet.     Allergies: Patient has no known allergies.    Review of Systems   Constitutional:  Negative for chills, fever and weight loss.   Respiratory:  Negative for cough, shortness of breath and wheezing.    Cardiovascular:  Negative for chest pain, palpitations and leg swelling.   Gastrointestinal:  Negative for abdominal pain, blood in stool, constipation, diarrhea, nausea and vomiting.   Genitourinary:  Negative for hematuria.   Skin:  Negative for itching and rash.   Neurological:  Negative for dizziness, tingling and headaches.     Health Maintenance: Deferred at this time   Objective:     /78 Comment: 124/78  Pulse 72   Temp 36.4 °C (97.5 °F) (Temporal)   Resp 17   Ht 1.626 m (5' 4\") Comment: per pt  Wt 75.4 kg (166 lb 3.2 oz)  "  SpO2 98%   BMI 28.53 kg/m²  Body mass index is 28.53 kg/m².     Physical Exam  Vitals reviewed.   Constitutional:       General: She is not in acute distress.     Appearance: Normal appearance. She is not ill-appearing.   Cardiovascular:      Rate and Rhythm: Normal rate and regular rhythm.   Pulmonary:      Effort: Pulmonary effort is normal. No respiratory distress.   Musculoskeletal:      Lumbar back: Tenderness present. No swelling or bony tenderness.   Skin:     Coloration: Skin is not jaundiced or pale.   Neurological:      General: No focal deficit present.      Mental Status: She is alert and oriented to person, place, and time.   Psychiatric:         Mood and Affect: Mood normal.         Behavior: Behavior normal.         Thought Content: Thought content normal.         Judgment: Judgment normal.        Assessment and Plan:     The following treatment plan was discussed through shared decision making with the patient:    1. Osteopenia of lumbar spine  Chronic, uncontrolled.  Discussed increasing calcium and vitamin D in her diet.  Provided informational handout in Romansh for her to take home and read.  Answered questions as needed.  My recommendations are as follows for increasing her bone density:  - 1200 mg of calcium daily through diet and/or supplementation (calcium citrate)  - 2000 to 4000 international units of vitamin D daily  - doing exercises that build muscle tone    2. Acute midline low back pain without sciatica  Acute, uncomplicated.  Instructed patient to take meloxicam for the next 2 weeks to see if this helps decrease inflammation and swelling.  Advised patient to follow-up in the next 2 to 3 weeks if her symptoms do not improve and we will consider sending her to physical therapy.  Instructed patient to not take meloxicam with any other type NSAID as well as making sure to take this with food.  - meloxicam (MOBIC) 7.5 MG Tab; Take 1 Tablet by mouth every day.  Dispense: 30 Tablet;  Refill: 1    3. Gastroesophageal reflux disease without esophagitis  Chronic, controlled.  Patient was recently switched to a larger dose of Prilosec as her symptoms are worsening.  Due to her recent osteopenia diagnosis, I encourage patient to alternate taking her Prilosec every other day and then on the days she does not take this medication to take Tums.  Educated patient that Tums not only can help with her acid reflux but will also help with her bone health as it is mostly calcium.      Return if symptoms worsen or fail to improve.         Please note that this note was created using dictation with voice recognition software. I have made every reasonable attempt to correct obvious errors, but I expect that there are errors of grammar and possibly content that I did not discover before finalizing the note.    FUNMILAYO Roach  [facility]  [facility]

## 2024-02-20 ENCOUNTER — HOSPITAL ENCOUNTER (OUTPATIENT)
Dept: LAB | Facility: MEDICAL CENTER | Age: 64
End: 2024-02-20
Payer: MEDICARE

## 2024-02-20 DIAGNOSIS — Z13.6 SCREENING FOR CARDIOVASCULAR CONDITION: ICD-10-CM

## 2024-02-20 DIAGNOSIS — Z13.228 SCREENING FOR ENDOCRINE, METABOLIC AND IMMUNITY DISORDER: ICD-10-CM

## 2024-02-20 DIAGNOSIS — Z13.29 SCREENING FOR ENDOCRINE, METABOLIC AND IMMUNITY DISORDER: ICD-10-CM

## 2024-02-20 DIAGNOSIS — E55.9 VITAMIN D DEFICIENCY: ICD-10-CM

## 2024-02-20 DIAGNOSIS — Z13.0 SCREENING FOR ENDOCRINE, METABOLIC AND IMMUNITY DISORDER: ICD-10-CM

## 2024-02-20 LAB
25(OH)D3 SERPL-MCNC: 26 NG/ML (ref 30–100)
ALBUMIN SERPL BCP-MCNC: 4.1 G/DL (ref 3.2–4.9)
ALBUMIN/GLOB SERPL: 1.4 G/DL
ALP SERPL-CCNC: 103 U/L (ref 30–99)
ALT SERPL-CCNC: 21 U/L (ref 2–50)
ANION GAP SERPL CALC-SCNC: 13 MMOL/L (ref 7–16)
AST SERPL-CCNC: 16 U/L (ref 12–45)
BASOPHILS # BLD AUTO: 0.5 % (ref 0–1.8)
BASOPHILS # BLD: 0.03 K/UL (ref 0–0.12)
BILIRUB SERPL-MCNC: 0.3 MG/DL (ref 0.1–1.5)
BUN SERPL-MCNC: 16 MG/DL (ref 8–22)
CALCIUM ALBUM COR SERPL-MCNC: 9.3 MG/DL (ref 8.5–10.5)
CALCIUM SERPL-MCNC: 9.4 MG/DL (ref 8.5–10.5)
CHLORIDE SERPL-SCNC: 105 MMOL/L (ref 96–112)
CHOLEST SERPL-MCNC: 199 MG/DL (ref 100–199)
CO2 SERPL-SCNC: 23 MMOL/L (ref 20–33)
CREAT SERPL-MCNC: 0.78 MG/DL (ref 0.5–1.4)
EOSINOPHIL # BLD AUTO: 0.26 K/UL (ref 0–0.51)
EOSINOPHIL NFR BLD: 4.3 % (ref 0–6.9)
ERYTHROCYTE [DISTWIDTH] IN BLOOD BY AUTOMATED COUNT: 43.7 FL (ref 35.9–50)
EST. AVERAGE GLUCOSE BLD GHB EST-MCNC: 134 MG/DL
FASTING STATUS PATIENT QL REPORTED: NORMAL
GFR SERPLBLD CREATININE-BSD FMLA CKD-EPI: 84 ML/MIN/1.73 M 2
GLOBULIN SER CALC-MCNC: 3 G/DL (ref 1.9–3.5)
GLUCOSE SERPL-MCNC: 121 MG/DL (ref 65–99)
HBA1C MFR BLD: 6.3 % (ref 4–5.6)
HCT VFR BLD AUTO: 45.5 % (ref 37–47)
HDLC SERPL-MCNC: 52 MG/DL
HGB BLD-MCNC: 14.7 G/DL (ref 12–16)
IMM GRANULOCYTES # BLD AUTO: 0.02 K/UL (ref 0–0.11)
IMM GRANULOCYTES NFR BLD AUTO: 0.3 % (ref 0–0.9)
LDLC SERPL CALC-MCNC: 112 MG/DL
LYMPHOCYTES # BLD AUTO: 2.85 K/UL (ref 1–4.8)
LYMPHOCYTES NFR BLD: 46.8 % (ref 22–41)
MCH RBC QN AUTO: 30.2 PG (ref 27–33)
MCHC RBC AUTO-ENTMCNC: 32.3 G/DL (ref 32.2–35.5)
MCV RBC AUTO: 93.6 FL (ref 81.4–97.8)
MONOCYTES # BLD AUTO: 0.66 K/UL (ref 0–0.85)
MONOCYTES NFR BLD AUTO: 10.8 % (ref 0–13.4)
NEUTROPHILS # BLD AUTO: 2.27 K/UL (ref 1.82–7.42)
NEUTROPHILS NFR BLD: 37.3 % (ref 44–72)
NRBC # BLD AUTO: 0 K/UL
NRBC BLD-RTO: 0 /100 WBC (ref 0–0.2)
PLATELET # BLD AUTO: 211 K/UL (ref 164–446)
PMV BLD AUTO: 11.6 FL (ref 9–12.9)
POTASSIUM SERPL-SCNC: 4.1 MMOL/L (ref 3.6–5.5)
PROT SERPL-MCNC: 7.1 G/DL (ref 6–8.2)
RBC # BLD AUTO: 4.86 M/UL (ref 4.2–5.4)
SODIUM SERPL-SCNC: 141 MMOL/L (ref 135–145)
TRIGL SERPL-MCNC: 173 MG/DL (ref 0–149)
TSH SERPL DL<=0.005 MIU/L-ACNC: 2.88 UIU/ML (ref 0.38–5.33)
WBC # BLD AUTO: 6.1 K/UL (ref 4.8–10.8)

## 2024-02-20 PROCEDURE — 80061 LIPID PANEL: CPT

## 2024-02-20 PROCEDURE — 84443 ASSAY THYROID STIM HORMONE: CPT

## 2024-02-20 PROCEDURE — 80053 COMPREHEN METABOLIC PANEL: CPT

## 2024-02-20 PROCEDURE — 36415 COLL VENOUS BLD VENIPUNCTURE: CPT

## 2024-02-20 PROCEDURE — 83036 HEMOGLOBIN GLYCOSYLATED A1C: CPT

## 2024-02-20 PROCEDURE — 85025 COMPLETE CBC W/AUTO DIFF WBC: CPT

## 2024-02-20 PROCEDURE — 82306 VITAMIN D 25 HYDROXY: CPT

## 2024-07-30 ENCOUNTER — HOSPITAL ENCOUNTER (OUTPATIENT)
Dept: RADIOLOGY | Facility: MEDICAL CENTER | Age: 64
End: 2024-07-30

## 2024-08-05 ENCOUNTER — APPOINTMENT (OUTPATIENT)
Dept: MEDICAL GROUP | Facility: PHYSICIAN GROUP | Age: 64
End: 2024-08-05
Payer: MEDICARE

## 2024-08-13 ENCOUNTER — HOSPITAL ENCOUNTER (OUTPATIENT)
Dept: RADIOLOGY | Facility: MEDICAL CENTER | Age: 64
End: 2024-08-13
Attending: INTERNAL MEDICINE
Payer: MEDICARE

## 2024-08-13 DIAGNOSIS — Z12.31 ENCOUNTER FOR SCREENING MAMMOGRAM FOR MALIGNANT NEOPLASM OF BREAST: ICD-10-CM

## 2024-08-13 PROCEDURE — 77067 SCR MAMMO BI INCL CAD: CPT

## 2024-08-22 ENCOUNTER — APPOINTMENT (OUTPATIENT)
Dept: MEDICAL GROUP | Facility: PHYSICIAN GROUP | Age: 64
End: 2024-08-22
Payer: MEDICARE

## 2024-08-22 VITALS
TEMPERATURE: 99.2 F | OXYGEN SATURATION: 97 % | RESPIRATION RATE: 20 BRPM | DIASTOLIC BLOOD PRESSURE: 72 MMHG | HEIGHT: 61 IN | BODY MASS INDEX: 31.74 KG/M2 | WEIGHT: 168.13 LBS | HEART RATE: 96 BPM | SYSTOLIC BLOOD PRESSURE: 110 MMHG

## 2024-08-22 DIAGNOSIS — E55.9 VITAMIN D DEFICIENCY: ICD-10-CM

## 2024-08-22 DIAGNOSIS — E78.5 DYSLIPIDEMIA: ICD-10-CM

## 2024-08-22 DIAGNOSIS — K21.9 GASTROESOPHAGEAL REFLUX DISEASE WITHOUT ESOPHAGITIS: ICD-10-CM

## 2024-08-22 DIAGNOSIS — R74.8 ALKALINE PHOSPHATASE ELEVATION: ICD-10-CM

## 2024-08-22 DIAGNOSIS — Z23 NEED FOR VACCINATION: ICD-10-CM

## 2024-08-22 DIAGNOSIS — R73.03 PREDIABETES: ICD-10-CM

## 2024-08-22 PROCEDURE — 3074F SYST BP LT 130 MM HG: CPT

## 2024-08-22 PROCEDURE — 90677 PCV20 VACCINE IM: CPT

## 2024-08-22 PROCEDURE — 3078F DIAST BP <80 MM HG: CPT

## 2024-08-22 PROCEDURE — G0009 ADMIN PNEUMOCOCCAL VACCINE: HCPCS

## 2024-08-22 PROCEDURE — 99214 OFFICE O/P EST MOD 30 MIN: CPT | Mod: 25

## 2024-08-22 RX ORDER — OMEPRAZOLE 40 MG/1
40 CAPSULE, DELAYED RELEASE ORAL DAILY
Qty: 90 CAPSULE | Refills: 0 | Status: SHIPPED | OUTPATIENT
Start: 2024-08-22

## 2024-08-22 RX ORDER — CHOLECALCIFEROL (VITAMIN D3) 50 MCG
TABLET ORAL DAILY
COMMUNITY

## 2024-08-22 ASSESSMENT — FIBROSIS 4 INDEX: FIB4 SCORE: 1.09

## 2024-08-22 NOTE — PROGRESS NOTES
"Subjective:     Chief Complaint   Patient presents with    Follow-Up     Interpretor Name: Lina  Interpretor ID: [id number] with [facility]  Patient preferred language used: Bulgarian    HPI: Amber presents today with  Problem   Dyslipidemia    Currently not taking any cholesterol-lowering medication or low-dose aspirin.  Lab Results   Component Value Date/Time    CHOLSTRLTOT 199 02/20/2024 0828    TRIGLYCERIDE 173 (H) 02/20/2024 0828    HDL 52 02/20/2024 0828     (H) 02/20/2024 0828   The 10-year ASCVD risk score (Joyce SANCHEZ, et al., 2019) is: 4.7%     Prediabetes         Vitamin D Deficiency    Currently taking vitamin D3 approximately 2000 units daily since blood work resulted showing slight vitamin D insufficiency       Alkaline Phosphatase Elevation    Lab work earlier this year indicated a slightly elevated alkaline phosphatase level.  Patient did have a history of this at 1 point in the past.     Gastroesophageal Reflux Disease Without Esophagitis    Currently taking Prilosec 40 mg every 3 days or so.    Tolerating medication well, controlled symptoms and denies any side effects     Allergies: Patient has no known allergies.  ROS per HPI  Health Maintenance: Deferred at this time   Objective:     /72 (BP Location: Left arm, Patient Position: Sitting, BP Cuff Size: Adult)   Pulse 96   Temp 37.3 °C (99.2 °F) (Temporal)   Resp 20   Ht 1.54 m (5' 0.63\")   Wt 76.3 kg (168 lb 2 oz)   SpO2 97%   Breastfeeding No   BMI 32.16 kg/m²  Body mass index is 32.16 kg/m².     Physical Exam  Vitals reviewed.   Constitutional:       General: She is not in acute distress.     Appearance: Normal appearance. She is not ill-appearing.   Cardiovascular:      Rate and Rhythm: Normal rate and regular rhythm.      Heart sounds: Normal heart sounds.   Pulmonary:      Effort: Pulmonary effort is normal.      Breath sounds: Normal breath sounds.   Abdominal:      General: Abdomen is flat.      Palpations: Abdomen is soft. "   Musculoskeletal:      Cervical back: Normal range of motion.   Skin:     General: Skin is warm and dry.   Neurological:      General: No focal deficit present.      Mental Status: She is alert and oriented to person, place, and time.   Psychiatric:         Mood and Affect: Mood normal.         Behavior: Behavior normal.         Thought Content: Thought content normal.         Judgment: Judgment normal.        Results for orders placed or performed during the hospital encounter of 02/20/24   VITAMIN D,25 HYDROXY (DEFICIENCY)   Result Value Ref Range    25-Hydroxy   Vitamin D 25 26 (L) 30 - 100 ng/mL   TSH WITH REFLEX TO FT4   Result Value Ref Range    TSH 2.880 0.380 - 5.330 uIU/mL   Lipid Profile   Result Value Ref Range    Cholesterol,Tot 199 100 - 199 mg/dL    Triglycerides 173 (H) 0 - 149 mg/dL    HDL 52 >=40 mg/dL     (H) <100 mg/dL   HEMOGLOBIN A1C   Result Value Ref Range    Glycohemoglobin 6.3 (H) 4.0 - 5.6 %    Est Avg Glucose 134 mg/dL   Comp Metabolic Panel   Result Value Ref Range    Sodium 141 135 - 145 mmol/L    Potassium 4.1 3.6 - 5.5 mmol/L    Chloride 105 96 - 112 mmol/L    Co2 23 20 - 33 mmol/L    Anion Gap 13.0 7.0 - 16.0    Glucose 121 (H) 65 - 99 mg/dL    Bun 16 8 - 22 mg/dL    Creatinine 0.78 0.50 - 1.40 mg/dL    Calcium 9.4 8.5 - 10.5 mg/dL    Correct Calcium 9.3 8.5 - 10.5 mg/dL    AST(SGOT) 16 12 - 45 U/L    ALT(SGPT) 21 2 - 50 U/L    Alkaline Phosphatase 103 (H) 30 - 99 U/L    Total Bilirubin 0.3 0.1 - 1.5 mg/dL    Albumin 4.1 3.2 - 4.9 g/dL    Total Protein 7.1 6.0 - 8.2 g/dL    Globulin 3.0 1.9 - 3.5 g/dL    A-G Ratio 1.4 g/dL   CBC WITH DIFFERENTIAL   Result Value Ref Range    WBC 6.1 4.8 - 10.8 K/uL    RBC 4.86 4.20 - 5.40 M/uL    Hemoglobin 14.7 12.0 - 16.0 g/dL    Hematocrit 45.5 37.0 - 47.0 %    MCV 93.6 81.4 - 97.8 fL    MCH 30.2 27.0 - 33.0 pg    MCHC 32.3 32.2 - 35.5 g/dL    RDW 43.7 35.9 - 50.0 fL    Platelet Count 211 164 - 446 K/uL    MPV 11.6 9.0 - 12.9 fL     Neutrophils-Polys 37.30 (L) 44.00 - 72.00 %    Lymphocytes 46.80 (H) 22.00 - 41.00 %    Monocytes 10.80 0.00 - 13.40 %    Eosinophils 4.30 0.00 - 6.90 %    Basophils 0.50 0.00 - 1.80 %    Immature Granulocytes 0.30 0.00 - 0.90 %    Nucleated RBC 0.00 0.00 - 0.20 /100 WBC    Neutrophils (Absolute) 2.27 1.82 - 7.42 K/uL    Lymphs (Absolute) 2.85 1.00 - 4.80 K/uL    Monos (Absolute) 0.66 0.00 - 0.85 K/uL    Eos (Absolute) 0.26 0.00 - 0.51 K/uL    Baso (Absolute) 0.03 0.00 - 0.12 K/uL    Immature Granulocytes (abs) 0.02 0.00 - 0.11 K/uL    NRBC (Absolute) 0.00 K/uL   FASTING STATUS   Result Value Ref Range    Fasting Status Fasting    ESTIMATED GFR   Result Value Ref Range    GFR (CKD-EPI) 84 >60 mL/min/1.73 m 2      Assessment and Plan:     The following treatment plan was discussed through shared decision making with the patient:    1. Dyslipidemia  Chronic, uncontrolled.   The patient is not on a statin for primary prevention. The last cholesterol panel in 2/2024 showed a slightly increased triglyceride and LDL levels.  Advised patient on a healthy diet including decreasing processed carbohydrates and saturated fatty foods.  As well as increasing physical activity.  Patient will check cholesterol levels in 6 months for follow-up  - Lipid Profile; Future    2. Prediabetes  Chronic, uncontrolled.  Patient has been working on diet.  Reinforced decreasing processed carbohydrates and added sugars as well as increasing physical activity.  - HEMOGLOBIN A1C; Future    3. Vitamin D deficiency  Chronic, ongoing.  Patient began taking an over-the-counter vitamin D3 supplement approximately 2000 units daily.  Will check updated blood work in 6 months  - VITAMIN D,25 HYDROXY (DEFICIENCY); Future    4. Alkaline phosphatase elevation  Incidental finding on lab work, will follow-up with the alkaline phosphatase isoenzymes and a repeat CMP in 6 months  - Comp Metabolic Panel; Future  - ALKALINE PHOSPHATASE ISOENZYMES; Future    5.  Gastroesophageal reflux disease without esophagitis  Chronic, controlled.  Patient symptoms well-controlled with omeprazole 40 mg every 3 days; provided refill in office today.  - omeprazole (PRILOSEC) 40 MG delayed-release capsule; Take 1 Capsule by mouth every day.  Dispense: 90 Capsule; Refill: 0    6. Need for vaccination  Due for pneumonia vaccination  - Pneumococcal Conjugate Vaccine 20-Valent (6 wks+)      Return in about 6 months (around 2/22/2025) for Labs, Annual.         Please note that this note was created using dictation with voice recognition software. I have made every reasonable attempt to correct obvious errors, but I expect that there are errors of grammar and possibly content that I did not discover before finalizing the note.    FUNMILAYO Roach  [facility]  [facility]

## 2024-10-09 ENCOUNTER — TELEPHONE (OUTPATIENT)
Dept: HEALTH INFORMATION MANAGEMENT | Facility: OTHER | Age: 64
End: 2024-10-09

## 2024-10-17 ENCOUNTER — OFFICE VISIT (OUTPATIENT)
Dept: FAMILY PLANNING/WOMEN'S HEALTH CLINIC | Facility: PHYSICIAN GROUP | Age: 64
End: 2024-10-17
Payer: MEDICARE

## 2024-10-17 VITALS
HEIGHT: 64 IN | SYSTOLIC BLOOD PRESSURE: 120 MMHG | WEIGHT: 165 LBS | DIASTOLIC BLOOD PRESSURE: 68 MMHG | BODY MASS INDEX: 28.17 KG/M2

## 2024-10-17 DIAGNOSIS — G25.0 ESSENTIAL TREMOR: ICD-10-CM

## 2024-10-17 DIAGNOSIS — E55.9 VITAMIN D DEFICIENCY: ICD-10-CM

## 2024-10-17 DIAGNOSIS — E78.5 DYSLIPIDEMIA: ICD-10-CM

## 2024-10-17 DIAGNOSIS — M85.88 OSTEOPENIA OF LUMBAR SPINE: ICD-10-CM

## 2024-10-17 DIAGNOSIS — K21.9 GASTROESOPHAGEAL REFLUX DISEASE WITHOUT ESOPHAGITIS: ICD-10-CM

## 2024-10-17 DIAGNOSIS — Z23 NEED FOR VACCINATION: ICD-10-CM

## 2024-10-17 PROCEDURE — G0008 ADMIN INFLUENZA VIRUS VAC: HCPCS

## 2024-10-17 PROCEDURE — 1125F AMNT PAIN NOTED PAIN PRSNT: CPT

## 2024-10-17 PROCEDURE — 3078F DIAST BP <80 MM HG: CPT

## 2024-10-17 PROCEDURE — 3074F SYST BP LT 130 MM HG: CPT

## 2024-10-17 PROCEDURE — G0439 PPPS, SUBSEQ VISIT: HCPCS | Mod: 25

## 2024-10-17 PROCEDURE — 90662 IIV NO PRSV INCREASED AG IM: CPT

## 2024-10-17 SDOH — ECONOMIC STABILITY: HOUSING INSECURITY: AT ANY TIME IN THE PAST 12 MONTHS, WERE YOU HOMELESS OR LIVING IN A SHELTER (INCLUDING NOW)?: NO

## 2024-10-17 SDOH — ECONOMIC STABILITY: INCOME INSECURITY: IN THE LAST 12 MONTHS, WAS THERE A TIME WHEN YOU WERE NOT ABLE TO PAY THE MORTGAGE OR RENT ON TIME?: NO

## 2024-10-17 SDOH — ECONOMIC STABILITY: FOOD INSECURITY: WITHIN THE PAST 12 MONTHS, YOU WORRIED THAT YOUR FOOD WOULD RUN OUT BEFORE YOU GOT MONEY TO BUY MORE.: NEVER TRUE

## 2024-10-17 SDOH — ECONOMIC STABILITY: INCOME INSECURITY: HOW HARD IS IT FOR YOU TO PAY FOR THE VERY BASICS LIKE FOOD, HOUSING, MEDICAL CARE, AND HEATING?: NOT VERY HARD

## 2024-10-17 SDOH — ECONOMIC STABILITY: FOOD INSECURITY: WITHIN THE PAST 12 MONTHS, THE FOOD YOU BOUGHT JUST DIDN'T LAST AND YOU DIDN'T HAVE MONEY TO GET MORE.: NEVER TRUE

## 2024-10-17 SDOH — ECONOMIC STABILITY: TRANSPORTATION INSECURITY
IN THE PAST 12 MONTHS, HAS LACK OF TRANSPORTATION KEPT YOU FROM MEETINGS, WORK, OR FROM GETTING THINGS NEEDED FOR DAILY LIVING?: NO

## 2024-10-17 SDOH — ECONOMIC STABILITY: TRANSPORTATION INSECURITY
IN THE PAST 12 MONTHS, HAS THE LACK OF TRANSPORTATION KEPT YOU FROM MEDICAL APPOINTMENTS OR FROM GETTING MEDICATIONS?: NO

## 2024-10-17 SDOH — ECONOMIC STABILITY: HOUSING INSECURITY: IN THE PAST 12 MONTHS, HOW MANY TIMES HAVE YOU MOVED WHERE YOU WERE LIVING?: 0

## 2024-10-17 ASSESSMENT — FIBROSIS 4 INDEX: FIB4 SCORE: 1.09

## 2024-10-17 ASSESSMENT — PAIN SCALES - GENERAL: PAINLEVEL: 4=SLIGHT-MODERATE PAIN

## 2024-10-17 ASSESSMENT — PATIENT HEALTH QUESTIONNAIRE - PHQ9: CLINICAL INTERPRETATION OF PHQ2 SCORE: 0

## 2024-10-17 ASSESSMENT — ENCOUNTER SYMPTOMS: GENERAL WELL-BEING: FAIR

## 2024-10-17 ASSESSMENT — ACTIVITIES OF DAILY LIVING (ADL): BATHING_REQUIRES_ASSISTANCE: 0

## 2025-03-20 ENCOUNTER — PATIENT MESSAGE (OUTPATIENT)
Dept: HEALTH INFORMATION MANAGEMENT | Facility: OTHER | Age: 65
End: 2025-03-20

## 2025-05-14 ENCOUNTER — HOSPITAL ENCOUNTER (OUTPATIENT)
Dept: LAB | Facility: MEDICAL CENTER | Age: 65
End: 2025-05-14
Payer: MEDICARE

## 2025-05-14 DIAGNOSIS — E78.5 DYSLIPIDEMIA: ICD-10-CM

## 2025-05-14 DIAGNOSIS — R74.8 ALKALINE PHOSPHATASE ELEVATION: ICD-10-CM

## 2025-05-14 DIAGNOSIS — E55.9 VITAMIN D DEFICIENCY: ICD-10-CM

## 2025-05-14 DIAGNOSIS — R73.03 PREDIABETES: ICD-10-CM

## 2025-05-14 LAB
25(OH)D3 SERPL-MCNC: 30 NG/ML (ref 30–100)
ALBUMIN SERPL BCP-MCNC: 4.6 G/DL (ref 3.2–4.9)
ALBUMIN/GLOB SERPL: 1.5 G/DL
ALP SERPL-CCNC: 106 U/L (ref 30–99)
ALT SERPL-CCNC: 38 U/L (ref 2–50)
ANION GAP SERPL CALC-SCNC: 10 MMOL/L (ref 7–16)
AST SERPL-CCNC: 26 U/L (ref 12–45)
BILIRUB SERPL-MCNC: 0.4 MG/DL (ref 0.1–1.5)
BUN SERPL-MCNC: 16 MG/DL (ref 8–22)
CALCIUM ALBUM COR SERPL-MCNC: 9.1 MG/DL (ref 8.5–10.5)
CALCIUM SERPL-MCNC: 9.6 MG/DL (ref 8.5–10.5)
CHLORIDE SERPL-SCNC: 102 MMOL/L (ref 96–112)
CHOLEST SERPL-MCNC: 212 MG/DL (ref 100–199)
CO2 SERPL-SCNC: 27 MMOL/L (ref 20–33)
CREAT SERPL-MCNC: 0.91 MG/DL (ref 0.5–1.4)
GFR SERPLBLD CREATININE-BSD FMLA CKD-EPI: 69 ML/MIN/1.73 M 2
GLOBULIN SER CALC-MCNC: 3 G/DL (ref 1.9–3.5)
GLUCOSE SERPL-MCNC: 134 MG/DL (ref 65–99)
HDLC SERPL-MCNC: 52 MG/DL
LDLC SERPL CALC-MCNC: 123 MG/DL
POTASSIUM SERPL-SCNC: 4.5 MMOL/L (ref 3.6–5.5)
PROT SERPL-MCNC: 7.6 G/DL (ref 6–8.2)
SODIUM SERPL-SCNC: 139 MMOL/L (ref 135–145)
TRIGL SERPL-MCNC: 183 MG/DL (ref 0–149)

## 2025-05-14 PROCEDURE — 83036 HEMOGLOBIN GLYCOSYLATED A1C: CPT

## 2025-05-14 PROCEDURE — 84080 ASSAY ALKALINE PHOSPHATASES: CPT

## 2025-05-14 PROCEDURE — 80061 LIPID PANEL: CPT

## 2025-05-14 PROCEDURE — 36415 COLL VENOUS BLD VENIPUNCTURE: CPT

## 2025-05-14 PROCEDURE — 84075 ASSAY ALKALINE PHOSPHATASE: CPT | Mod: 59

## 2025-05-14 PROCEDURE — 80053 COMPREHEN METABOLIC PANEL: CPT

## 2025-05-14 PROCEDURE — 82306 VITAMIN D 25 HYDROXY: CPT

## 2025-05-15 LAB
EST. AVERAGE GLUCOSE BLD GHB EST-MCNC: 140 MG/DL
HBA1C MFR BLD: 6.5 % (ref 4–5.6)

## 2025-05-16 LAB
ALP BONE SERPL-CCNC: 66 U/L (ref 0–55)
ALP ISOS SERPL HS-CCNC: 0 U/L
ALP LIVER SERPL-CCNC: 59 U/L (ref 0–94)
ALP SERPL-CCNC: 125 U/L (ref 40–120)

## 2025-05-20 ENCOUNTER — APPOINTMENT (OUTPATIENT)
Dept: MEDICAL GROUP | Facility: PHYSICIAN GROUP | Age: 65
End: 2025-05-20
Payer: MEDICARE

## 2025-05-22 ENCOUNTER — APPOINTMENT (OUTPATIENT)
Dept: MEDICAL GROUP | Facility: PHYSICIAN GROUP | Age: 65
End: 2025-05-22
Payer: MEDICARE

## 2025-05-22 VITALS
OXYGEN SATURATION: 97 % | TEMPERATURE: 98.7 F | SYSTOLIC BLOOD PRESSURE: 130 MMHG | RESPIRATION RATE: 18 BRPM | HEART RATE: 76 BPM | WEIGHT: 170 LBS | BODY MASS INDEX: 32.1 KG/M2 | DIASTOLIC BLOOD PRESSURE: 72 MMHG | HEIGHT: 61 IN

## 2025-05-22 DIAGNOSIS — L70.0 BLACK HEAD: ICD-10-CM

## 2025-05-22 DIAGNOSIS — E78.5 DYSLIPIDEMIA: ICD-10-CM

## 2025-05-22 DIAGNOSIS — N89.8 VAGINAL DRYNESS: ICD-10-CM

## 2025-05-22 DIAGNOSIS — R73.03 PREDIABETES: ICD-10-CM

## 2025-05-22 DIAGNOSIS — Z00.00 WELLNESS EXAMINATION: Primary | ICD-10-CM

## 2025-05-22 DIAGNOSIS — R74.8 ALKALINE PHOSPHATASE ELEVATION: ICD-10-CM

## 2025-05-22 DIAGNOSIS — Z12.83 SKIN CANCER SCREENING: ICD-10-CM

## 2025-05-22 RX ORDER — ESTRADIOL 0.1 MG/G
1 CREAM VAGINAL
Qty: 42.5 G | Refills: 0 | Status: SHIPPED | OUTPATIENT
Start: 2025-05-22

## 2025-05-22 ASSESSMENT — PATIENT HEALTH QUESTIONNAIRE - PHQ9: CLINICAL INTERPRETATION OF PHQ2 SCORE: 0

## 2025-05-22 ASSESSMENT — FIBROSIS 4 INDEX: FIB4 SCORE: 1.34

## 2025-05-22 NOTE — PROGRESS NOTES
Subjective:      Chief Complaint   Patient presents with    Annual Exam    Lab Results   Interpretor Name: Shiela   Interpretor ID: [id number]  Patient preferred language used: Citizen of the [place]    HPI: Amber House is a 65 y.o. female who presents for annual exam. She is feeling well but would like to go over lab results in some questions regarding muscle pain as well as a skin lesion    Ob-Gyn/ History:    Patient has GYN provider: no  /Para:  3/3  Last Pap Smear:  2 years ago. No history of abnormal pap smears.  Gyn Surgery:  C-sections , , ; Vaginal Hysterectomy .  Current Contraceptive Method:  Hysterectomy. Currently sexually active.  Last menstrual period:  Vaginal Hysterectomy   No significant bloating/fluid retention, pelvic pain, or dyspareunia.   No abnormal vaginal discharge.  Post-menopausal bleeding: none  Urinary incontinence: rare, non-concerning    Health Maintenance  Advanced directive: Educated and discussed   Osteoporosis Screen/ DEXA: 2/15/2024 revealed osteopenia to the lumbar spine as well as left femur, lumbar spine more significant than left femur; working on increasing vitamin D as well as calcium with strength training -- due for repeat DEXA in   PT for falls prevention: Educated and discussed   Cholesterol Screenin/15/2025 dyslipidemia; The 10-year ASCVD risk score (Joyce DK, et al., 2019) is: 7.4% -- would like to correct with diet, recheck labs in 3-6 months  Diabetes Screenin/15/2025 prediabetes/borderline type 2 diabetes mellitus -- would like to correct with diet, recheck labs in 3-6 months  Aspirin Use: N/A     Anticipatory Guidance  Diet: Patient has been working on improving overall diet  Exercise: Patient has been working on increasing physical activity   Substance Abuse: none   Safe in relationship.   Seat belts, bike helmet safety discussed.  Sun protection used. Dental Home.    Cancer screening  Colorectal Cancer Screening: Colonoscopy  completed in 2022, recall of 10 years --- due in 2032   Lung Cancer Screening: Non-smoker, N/A   Cervical Cancer Screening: No history of abnormal PAP, completed/aged out  Breast Cancer Screening: Mammogram 8/13/2024 revealed scattered areas of fibroglandular density with no signs of cancer --- recommended annual mammography, patient chooses to move to biannual --due August 2026     Infectious disease screening/Immunizations  --STI Screening: Monogamous relationship, no concerns in office today   --Practices safe sex.  --HIV Screening: Declined   --Hepatitis C Screening: Declined   --Immunizations: Due for second and final dose of shingles vaccine, COVID-19 booster, tetanus     She  has a past medical history of Pneumonia due to COVID-19 virus (11/05/2020).  She  has a past surgical history that includes primary c section (1982); primary c section (1985); primary c section (1987); bunionectomy (2023); and vaginal hysterectomy total (2004).    Family History   Problem Relation Age of Onset    Stroke Mother     Hypertension Mother     Diabetes Mother     Hypertension Sister     Diabetes Sister     Hypertension Sister     Diabetes Sister     Diabetes Sister     Cancer Paternal Aunt         stomach     Social History     Socioeconomic History    Marital status:      Spouse name: Not on file    Number of children: Not on file    Years of education: Not on file    Highest education level: Not on file   Occupational History    Not on file   Tobacco Use    Smoking status: Never    Smokeless tobacco: Never   Vaping Use    Vaping status: Never Used   Substance and Sexual Activity    Alcohol use: Yes     Alcohol/week: 1.2 oz     Types: 2 Glasses of wine per week     Comment: occ    Drug use: Never    Sexual activity: Yes     Partners: Male   Other Topics Concern    Not on file   Social History Narrative    Not on file     Social Drivers of Health     Financial Resource Strain: Low Risk  (10/17/2024)    Overall Financial  Resource Strain (CARDIA)     Difficulty of Paying Living Expenses: Not very hard   Food Insecurity: No Food Insecurity (10/17/2024)    Hunger Vital Sign     Worried About Running Out of Food in the Last Year: Never true     Ran Out of Food in the Last Year: Never true   Transportation Needs: No Transportation Needs (10/17/2024)    PRAPARE - Transportation     Lack of Transportation (Medical): No     Lack of Transportation (Non-Medical): No   Physical Activity: Not on file   Stress: Not on file   Social Connections: Not on file   Intimate Partner Violence: Not on file   Housing Stability: Low Risk  (10/17/2024)    Housing Stability Vital Sign     Unable to Pay for Housing in the Last Year: No     Number of Times Moved in the Last Year: 0     Homeless in the Last Year: No     Patient Active Problem List    Diagnosis Date Noted    Dyslipidemia 08/22/2024    Prediabetes 08/22/2024    Vitamin D deficiency 08/22/2024    Alkaline phosphatase elevation 08/22/2024    Osteopenia of lumbar spine 02/16/2024    Acute midline low back pain without sciatica 02/16/2024    Essential tremor 01/29/2024    Vertigo 01/29/2024    Gastroesophageal reflux disease without esophagitis 01/29/2024    BMI 28.0-28.9,adult 12/22/2023    Nonspecific abnormal function study, cardiovascular 12/22/2023     Current Medications[1]    Allergies[2]    Review of Systems   Constitutional: Negative for fever, chills and malaise/fatigue.   HENT: Negative for congestion.    Eyes: Negative for pain.    Respiratory: Negative for cough and shortness of breath.  Cardiovascular: Negative for leg swelling.   Gastrointestinal: Negative for nausea, vomiting, abdominal pain and diarrhea.   Genitourinary: Negative for dysuria and hematuria.   Skin: Negative for rash.   Neurological: Negative for dizziness, focal weakness and headaches.   Endo/Heme/Allergies: Does not bleed easily.   Psychiatric/Behavioral: Negative for depression. The patient is not nervous/anxious.   "    Objective:     /72 (BP Location: Left arm, Patient Position: Sitting, BP Cuff Size: Adult)   Pulse 76   Temp 37.1 °C (98.7 °F) (Temporal)   Resp 18   Ht 1.54 m (5' 0.63\")   Wt 77.1 kg (170 lb)   SpO2 97%   Breastfeeding No   BMI 32.51 kg/m²   Body mass index is 32.51 kg/m².  Wt Readings from Last 4 Encounters:   05/22/25 77.1 kg (170 lb)   10/17/24 74.8 kg (165 lb)   08/22/24 76.3 kg (168 lb 2 oz)   02/16/24 75.4 kg (166 lb 3.2 oz)       Physical Exam:  Constitutional: Well-developed and well-nourished. Not diaphoretic. No distress.   Skin: Skin is warm and dry. No rash noted.  Several blackheads located along patient's back with a large lump to the right shoulder.  Head: Atraumatic without lesions.  Eyes: Conjunctivae and extraocular motions are normal. Pupils are equal, round, and reactive to light. No scleral icterus.   Ears:  External ears unremarkable. Tympanic membranes clear and intact.  Nose: Nares patent. Septum midline. Turbinates without erythema nor edema. No discharge.   Mouth/Throat: Dentition is intact. Tongue normal. Oropharynx is clear and moist. Posterior pharynx without erythema or exudates.  Neck: Supple, trachea midline. Normal range of motion. No thyromegaly present. No lymphadenopathy--cervical or supraclavicular.  Cardiovascular: Regular rate and rhythm, S1 and S2 without murmur, rubs, or gallops.  Lungs: Normal inspiratory effort, CTA bilaterally, no wheezes/rhonchi/rales  Breast: Deferred  Abdomen: Soft, non tender, and without distention. Active bowel sounds in all four quadrants. No rebound, guarding, masses or HSM.  :Deferred  Extremities: No cyanosis, clubbing, erythema, nor edema. Distal pulses intact and symmetric.   Musculoskeletal: All major joints AROM full in all directions without pain.  No tenderness upon palpation  Neurological: Alert and oriented x 3. DTRs 2+/3 and symmetric. No cranial nerve deficit. 5/5 myotomes. Sensation intact.   Psychiatric:  " "Behavior, mood, and affect are appropriate.    Assessment and Plan:     The following treatment plan was discussed through shared decision making with the patient:    1. Wellness examination (Primary)  HCM: completed  Labs per orders  Immunizations --recommended shingles vaccine completion at her local pharmacy, as well as COVID booster vaccine and her next Tdap  Patient counseling:  - Encouraged healthy lifestyle measures such as healthy diet and regular exercise.  - Discussed brushing, flossing, dental visits.  - Reviewed sun protective behavior including sunscreen application and reapplication, appropriate choice of SPF, hats, protective clothing, seeking shade and never choosing to \"lie out\" in the sun.  - Discussed safety belts, safety helmets, smoke detector, gun safety.  - Reviewed all screenings/vaccinations; updated in the chart as needed.    2. Prediabetes  Chronic, uncontrolled.  Patient will work on diet and lifestyle recommendations provided in office today and repeat blood work in 3 to 6 months and follow-up in office.  Did discuss with patient if this does not improve we will likely consider starting medication towards decreasing hemoglobin A1c with such as metformin  - HEMOGLOBIN A1C; Future    3. Alkaline phosphatase elevation  Patient's alkaline phosphatase isoenzymes revealed elevated bone fraction isoenzymes.  Will complete a nuc med bone scan.    - NM-BONE/JOINT SCAN WHOLE BODY; Future    4. Dyslipidemia  Chronic, uncontrolled.  Patient's cholesterol levels have been elevated for at least the last year and a half did discuss with patient that her ASCVD risk score is technically within range to start therapy medication.  Patient would like to not start any statin therapy at this moment I would like to recheck labs after working on diet and lifestyle over the summer  - Lipid Profile; Future    5. Vaginal dryness  Chronic, uncontrolled.  Patient is struggling with some dyspareunia as well as " dryness.  Discussed utilizing lubrication during intercourse versus using a estrogen cream twice weekly to see if this will help improve that as well as prevent any recurrent UTIs.  Patient is agreeable would like to trial this cream  Denies any history of breast cancer or family history of  - estradiol (ESTRACE) 0.1 MG/GM vaginal cream; Insert 1 g into the vagina two times a week.  Dispense: 42.5 g; Refill: 0    6. Black head  7. Skin cancer screening  Patient does have a several blackheads along her shoulders posteriorly as well as a lump below the skin surface to 1 of these.  Patient is interested in seeing dermatology for skin cancer screening and checks.  Referral has been placed  - Referral to Dermatology    Return in about 6 months (around 11/22/2025) for Labs.         Please note that this note was created using dictation with voice recognition software. I have made every reasonable attempt to correct obvious errors, but I expect that there are errors of grammar and possibly content that I did not discover before finalizing the note.    FUNMILAYO Mortensen  [facility]  [facility]         [1]   Current Outpatient Medications:     estradiol (ESTRACE) 0.1 MG/GM vaginal cream, Insert 1 g into the vagina two times a week., Disp: 42.5 g, Rfl: 0    Calcium Carbonate (CALCIUM 500 PO), Take  by mouth., Disp: , Rfl:     Cholecalciferol (VITAMIN D) 2000 UNIT Tab, Take  by mouth every day., Disp: , Rfl:     omeprazole (PRILOSEC) 40 MG delayed-release capsule, Take 1 Capsule by mouth every day., Disp: 90 Capsule, Rfl: 0  [2] No Known Allergies

## 2025-05-22 NOTE — Clinical Note
REFERRAL APPROVAL NOTICE         Sent on May 22, 2025                   Amber Francoaudie  [address] [place] [address]                   Dear Ms. Tye House,    After a careful review of the medical information and benefit coverage, [facility] has processed your referral. See below for additional details.    If applicable, you must be actively enrolled with your insurance for coverage of the authorized service. If you have any questions regarding your coverage, please contact your insurance directly.    REFERRAL INFORMATION   Referral #:  [id number]  Referred-To Department    Referred-By Provider:  Dermatology    BLANCA Roach   [facility]      [address]  [place] [id number] [phone] [address]  [place] [id number] [phone]    Referral Start Date:  05/22/2025  Referral End Date:   05/22/2026             SCHEDULING  If you do not already have an appointment, please call [phone] to make an appointment.     MORE INFORMATION  If you do not already have a [employer] [facility] [employer] account, sign up at: [email or web address]  You can access your medical information, make appointments, see lab results, billing information, and more.  If you have questions regarding this referral, please contact  the [facility] Referrals department at:             [phone]. Monday - Friday 8:00AM - 5:00PM.     Sincerely,    [facility]

## 2025-05-29 ENCOUNTER — OFFICE VISIT (OUTPATIENT)
Dept: DERMATOLOGY | Facility: IMAGING CENTER | Age: 65
End: 2025-05-29
Payer: MEDICARE

## 2025-05-29 DIAGNOSIS — L81.4 LENTIGINES: ICD-10-CM

## 2025-05-29 DIAGNOSIS — L72.0 EPIDERMAL CYST: ICD-10-CM

## 2025-05-29 DIAGNOSIS — L82.1 SK (SEBORRHEIC KERATOSIS): ICD-10-CM

## 2025-05-29 DIAGNOSIS — D48.9 NEOPLASM OF UNCERTAIN BEHAVIOR: Primary | ICD-10-CM

## 2025-05-29 NOTE — PROGRESS NOTES
DERMATOLOGY NOTE  NEW VISIT       Chief complaint: Establish Care (Skin lesion )     Declines CORDELL      HPI/location: lesion on rt back   Time present: 2 yrs   Painful lesion: No  Itching lesion: Yes  Enlarging lesion: No  Anything make it better or worse?    HPI/location: lesions on face   Time present:   Painful lesion: No  Itching lesion: No  Enlarging lesion: No  Anything make it better or worse?      History of skin cancer: No  History of precancers/actinic keratoses: No  History of biopsies:Yes, Details: rt cheek- benign   History of blistering/severe sunburns:No  Family history of skin cancer:No  Family history of atypical moles:No    Allergies[1]     MEDICATIONS:  Medications relevant to specialty reviewed.     REVIEW OF SYSTEMS:   Positive for skin (see HPI)  Negative for fevers and chills       EXAM:  There were no vitals taken for this visit.  Constitutional: Well-developed, well-nourished, and in no distress.     A focused skin exam was performed including the affected areas of the back and face. Notable findings on exam today listed below and/or in assessment/plan.     Approx. 5-6 mm pink/tan papule with telangiectasia, mid upper forehead/vertex in hairilne  Approx. 1.2 cm cystic structure to R posterior shoulder, visible punctum, no evidence of abscess  -sun exposed skin of face with scattered clinically benign light brown reticulated macules all of which were morphologically similar and none of which were suspicious for skin cancer today on exam  few tan stuck-on waxy papules scattered on the face    IMPRESSION / PLAN:    1. 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 Time out completed.  Area of biopsy prepped with alcohol. Anesthesia with 1% lidocaine with epinephrine administered with 30 gauge needle. Shave biopsy of the site performed. Hemostasis achieved with pressure and aluminum chloride. Vaseline applied to wound with bandage. Patient tolerated procedure well and there were no complications. The specimen was sent to the pathology lab by the staff. Wound care was discussed.      2. Epidermal cyst  Discussed course/nature of cyst  Bothersome, tender and irritating, getting bigger  Pt would like removed, PA from completed    3. Lentigines  - Benign-appearing nature of lesions discussed during exam.   - Advised to continue to monitor for any return to clinic for new or concerning changes.      4. SK (seborrheic keratosis)  - Benign-appearing nature of lesions discussed during exam.   - Advised to continue to monitor for any return to clinic for new or concerning changes.        Discussed risks associated with shave bx, Patient verbalized understanding and agrees with plan regarding the above          Please note that this dictation was created using voice recognition software. I have made every reasonable attempt to correct obvious errors, but I expect that there are errors of grammar and possibly content that I did not discover before finalizing the note.      Return to clinic in: Return for Pending Bx results. and as needed for any new or changing skin lesions.             [1] No Known Allergies

## 2025-06-05 ENCOUNTER — TELEPHONE (OUTPATIENT)
Dept: DERMATOLOGY | Facility: IMAGING CENTER | Age: 65
End: 2025-06-05
Payer: MEDICARE

## 2025-06-05 DIAGNOSIS — C44.319 BASAL CELL CARCINOMA OF FOREHEAD: Primary | ICD-10-CM

## 2025-06-05 NOTE — TELEPHONE ENCOUNTER
Spoke to pts daughter per pt request. Explained and shared bx results, pt's daughter has no further questions and verbalizes understanding. [facility] info given.

## 2025-06-05 NOTE — Clinical Note
REFERRAL APPROVAL NOTICE         Sent on June 5, 2025                   Amber House  [address] [facility] [place] [address]                   Dear Ms. Tye House,    After a careful review of the medical information and benefit coverage, [facility] has processed your referral. See below for additional details.    If applicable, you must be actively enrolled with your insurance for coverage of the authorized service. If you have any questions regarding your coverage, please contact your insurance directly.    REFERRAL INFORMATION   Referral #:  [id number]  Referred-To Department    Referred-By Provider:  Dermatology    BLANCA Monge   [facility]      [address] [facility]  [address]  [place] [id number] [phone] [address] [facility] [address] [facility] [address]  [place] [id number] [phone]    Referral Start Date:  06/05/2025  Referral End Date:   06/05/2026             SCHEDULING  If you do not already have an appointment, please call [phone] to make an appointment.     MORE INFORMATION  If you do not already have a [employer] account, sign up at: [email or web address]  You can access your medical information, make appointments, see lab results, billing information, and more.  If you have questions regarding this referral, please contact  the [facility] Referrals department at:             [phone]. Monday - Friday 8:00AM - 5:00PM.     Sincerely,    [facility]

## 2025-06-23 ENCOUNTER — HOSPITAL ENCOUNTER (OUTPATIENT)
Dept: RADIOLOGY | Facility: MEDICAL CENTER | Age: 65
End: 2025-06-23
Payer: MEDICARE

## 2025-06-23 DIAGNOSIS — R74.8 ALKALINE PHOSPHATASE ELEVATION: ICD-10-CM

## 2025-06-23 PROCEDURE — A9503 TC99M MEDRONATE: HCPCS

## 2025-06-24 ENCOUNTER — APPOINTMENT (OUTPATIENT)
Dept: URBAN - METROPOLITAN AREA CLINIC 22 | Facility: CLINIC | Age: 65
Setting detail: DERMATOLOGY
End: 2025-06-24

## 2025-06-24 PROBLEM — C44.41 BASAL CELL CARCINOMA OF SKIN OF SCALP AND NECK: Status: ACTIVE | Noted: 2025-06-24

## 2025-06-24 PROCEDURE — ? MOHS SURGERY

## 2025-06-24 PROCEDURE — ? REFERRAL CORRESPONDENCE

## 2025-06-24 NOTE — HPI: PROCEDURE (MOHS)
Has The Growth Been Previously Biopsied?: has been previously biopsied
Body Location Override (Optional): mid upper forehead

## 2025-06-24 NOTE — PROCEDURE: MOHS SURGERY
Oculoplastic Surgeon Procedure Text (B): After obtaining clear surgical margins the patient was sent to oculoplastics for surgical repair.  The patient understands they will receive post-surgical care and follow-up from the referring physician's office.
Donor Site Anesthesia Volume In Cc: 0
Banner Transposition Flap Text: The defect edges were debeveled with a #15 scalpel blade. Given the location of the defect and the proximity to free margins a Banner transposition flap was deemed most appropriate. Using a sterile surgical marker, an appropriate flap was drawn around the defect. The area thus outlined was incised deep to adipose tissue with a #15 scalpel blade. The skin margins were undermined to an appropriate distance in all directions utilizing iris scissors. Following this, the designed flap was carried into the primary defect and sutured into place.
Alternatives Discussed Intro (Do Not Add Period): I discussed alternative treatments to Mohs surgery and specifically discussed the risks and benefits of
Stage 10: Additional Anesthesia Type: 1% lidocaine with epinephrine
Cartilage Fenestration Performed?: No
Mohs Case Number: [id number]
Staging Info: By selecting yes to the question above you will include information on AJCC 8 tumor staging in your Mohs note. Information on tumor staging will be automatically added for SCCs on the head and neck. AJCC 8 includes tumor size, tumor depth, perineural involvement and bone invasion.
Asc Procedure Text (B): After obtaining clear surgical margins the patient was sent to an ASC for surgical repair.  The patient understands they will receive post-surgical care and follow-up from the ASC physician.
Plastic Surgeon Procedure Text (D): After obtaining clear surgical margins the patient was sent to plastics for surgical repair.  The patient understands they will receive post-surgical care and follow-up from the referring physician's office.
Nostril Rim Text: The closure involved the nostril rim.
Epidermal Autograft Text: The defect edges were debeveled with a #15 scalpel blade. Given the location of the defect, shape of the defect and the proximity to free margins an epidermal autograft was deemed most appropriate. Using a sterile surgical marker, the primary defect shape was transferred to the donor site. The epidermal graft was then harvested.  The skin graft was then placed in the primary defect and oriented appropriately.
Show Patient Name Variable: Yes
Inflammation Suggestive Of Cancer Camouflage Histology Text: There was a dense lymphocytic infiltrate which prevented adequate histologic evaluation of adjacent structures.
Area M Indication Text: Tumors in this location are included in Area M (cheek, forehead, scalp, neck, jawline and pretibial skin).  Mohs surgery is indicated for tumors in these anatomic locations.
Presence Of Scar Tissue (For Histology): present
Cheek Interpolation Flap Text: A decision was made to reconstruct the defect utilizing an interpolation axial flap and a staged reconstruction.  A telfa template was made of the defect.  This telfa template was then used to outline the Cheek Interpolation flap.  The donor area for the pedicle flap was then injected with anesthesia.  The flap was excised through the skin and subcutaneous tissue down to the layer of the underlying musculature.  The interpolation flap was carefully excised within this deep plane to maintain its blood supply.  The edges of the donor site were undermined.   The donor site was closed in a primary fashion.  The pedicle was then rotated into position and sutured.  Once the tube was sutured into place, adequate blood supply was confirmed with blanching and refill.  The pedicle was then wrapped with xeroform gauze and dressed appropriately with a telfa and gauze bandage to ensure continued blood supply and protect the attached pedicle.
Partial Purse String (Simple) Text: Given the location of the defect and the characteristics of the surrounding skin a simple purse string closure was deemed most appropriate.  Undermining was performed circumferentially around the surgical defect.  A purse string suture was then placed and tightened. Wound tension only allowed a partial closure of the circular defect.
Referred To Otolaryngology For Closure Text (Leave Blank If You Do Not Want): After obtaining clear surgical margins the patient was sent to otolaryngology for surgical repair.  The patient understands they will receive post-surgical care and follow-up from the referring physician's office.
Nasalis Myocutaneous Flap Text: Using a #15 blade, an incision was made around the donor flap to the level of the nasalis muscle. Wide lateral undermining was then performed in both the subcutaneous plane above the nasalis muscle, and in a submuscular plane just above periosteum. This allowed the formation of a free nasalis muscle axial pedicle which was still attached to the actual cutaneous flap, increasing its mobility and vascular viability. Hemostasis was obtained with pinpoint electrocoagulation. The flap was mobilized into position and the pivotal anchor points positioned and stabilized with buried interrupted sutures. Subcutaneous and dermal tissues were closed in a multilayered fashion with sutures. Tissue redundancies were excised, and the epidermal edges were apposed without significant tension and sutured with sutures.
Trilobed Flap Text: The defect edges were debeveled with a #15 scalpel blade. Given the location of the defect and the proximity to free margins a trilobed flap was deemed most appropriate. Using a sterile surgical marker, an appropriate trilobed flap was drawn around the defect. The area thus outlined was incised deep to adipose tissue with a #15 scalpel blade. The skin margins were undermined to an appropriate distance in all directions utilizing iris scissors. Following this, the designed flap was carried into the primary defect and sutured into place.
Mid-Level Procedure Text (C): After obtaining clear surgical margins the patient was sent to a mid-level provider for surgical repair.  The patient understands they will receive post-surgical care and follow-up from the mid-level provider.
Double Z Plasty Text: The lesion was extirpated to the level of the fat with a #15 scalpel blade. Given the location of the defect, shape of the defect and the proximity to free margins a double Z-plasty was deemed most appropriate for repair. Using a sterile surgical marker, the appropriate transposition arms of the double Z-plasty were drawn incorporating the defect and placing the expected incisions within the relaxed skin tension lines where possible. The area thus outlined was incised deep to adipose tissue with a #15 scalpel blade. The skin margins were undermined to an appropriate distance in all directions utilizing iris scissors. The opposing transposition arms were then transposed and carried over into place in opposite direction and anchored with interrupted buried subcutaneous sutures.
Keystone Flap Text: The defect edges were debeveled with a #15 scalpel blade. Given the location of the defect, shape of the defect a keystone flap was deemed most appropriate. Using a sterile surgical marker, an appropriate keystone flap was drawn incorporating the defect, outlining the appropriate donor tissue and placing the expected incisions within the relaxed skin tension lines where possible. The area thus outlined was incised deep to adipose tissue with a #15 scalpel blade. The skin margins were undermined to an appropriate distance in all directions around the primary defect and laterally outward around the flap utilizing iris scissors. Following this, the designed flap was carried into the primary defect and sutured into place.
Peng Advancement Flap Text: The defect edges were debeveled with a #15 scalpel blade. Given the location of the defect, shape of the defect and the proximity to free margins a Peng advancement flap was deemed most appropriate. Using a sterile surgical marker, an appropriate advancement flap was drawn incorporating the defect and placing the expected incisions within the relaxed skin tension lines where possible. The area thus outlined was incised deep to adipose tissue with a #15 scalpel blade. The skin margins were undermined to an appropriate distance in all directions utilizing iris scissors. Following this, the designed flap was advanced and carried over into the primary defect and sutured into place.
Tumor Depth: Less than 6mm from granular layer and no invasion beyond the subcutaneous fat
Eyelid Partial Thickness Repair - 52074: The eyelid defect was partial thickness which required a wedge repair of the eyelid. Special care was taken to ensure that the eyelid margin was realligned when placing sutures.
Consent (Temporal Branch)/Introductory Paragraph: The rationale for Mohs was explained to the patient and consent was obtained. The risks, benefits and alternatives to therapy were discussed in detail. Specifically, the risks of damage to the temporal branch of the facial nerve, infection, scarring, bleeding, prolonged wound healing, incomplete removal, allergy to anesthesia, and recurrence were addressed. Prior to the procedure, the treatment site was clearly identified and confirmed by the patient. All components of Universal Protocol/PAUSE Rule completed.
Wound Care: Petrolatum
Tissue Cultured Epidermal Autograft Text: The defect edges were debeveled with a #15 scalpel blade. Given the location of the defect, shape of the defect and the proximity to free margins a tissue cultured epidermal autograft was deemed most appropriate.  The graft was then trimmed to fit the size of the defect.  The graft was then placed in the primary defect and oriented appropriately.
Bilobed Transposition Flap Text: The defect edges were debeveled with a #15 scalpel blade. Given the location of the defect and the proximity to free margins a bilobed transposition flap was deemed most appropriate. Using a sterile surgical marker, an appropriate bilobe flap drawn around the defect. The area thus outlined was incised deep to adipose tissue with a #15 scalpel blade. The skin margins were undermined to an appropriate distance in all directions utilizing iris scissors. Following this, the designed flap was carried over into the primary defect and sutured into place.
Rectangular Flap Text: The defect edges were debeveled with a #15 scalpel blade. Given the location of the defect and the proximity to free margins a rectangular flap was deemed most appropriate. Using a sterile surgical marker, an appropriate rectangular flap was drawn incorporating the defect. The area thus outlined was incised deep to adipose tissue with a #15 scalpel blade. The skin margins were undermined to an appropriate distance in all directions utilizing iris scissors. Following this, the designed flap was carried over into the primary defect and sutured into place.
Graft Cartilage Fenestration Text: The cartilage was fenestrated with a 2mm punch biopsy to help facilitate graft survival and healing.
Eyelid Full Thickness Repair - 76898: The eyelid defect was full thickness which required a wedge repair of the eyelid. Special care was taken to ensure that the eyelid margin was realligned when placing sutures.
Melolabial Transposition Flap Text: The defect edges were debeveled with a #15 scalpel blade. Given the location of the defect and the proximity to free margins a melolabial flap was deemed most appropriate. Using a sterile surgical marker, an appropriate melolabial transposition flap was drawn incorporating the defect. The area thus outlined was incised deep to adipose tissue with a #15 scalpel blade. The skin margins were undermined to an appropriate distance in all directions utilizing iris scissors. Following this, the designed flap was carried over into the primary defect and sutured into place.
Bi-Rhombic Flap Text: The defect edges were debeveled with a #15 scalpel blade. Given the location of the defect and the proximity to free margins a bi-rhombic flap was deemed most appropriate. Using a sterile surgical marker, an appropriate rhombic flap was drawn incorporating the defect. The area thus outlined was incised deep to adipose tissue with a #15 scalpel blade. The skin margins were undermined to an appropriate distance in all directions utilizing iris scissors. Following this, the designed flap was carried over into the primary defect and sutured into place.
Unna Boot Text: An Unna boot was placed to help immobilize the limb and facilitate more rapid healing.
Simple / Intermediate / Complex Repair - Final Wound Length In Cm: 4.7
Xenograft Text: The defect edges were debeveled with a #15 scalpel blade. Given the location of the defect, shape of the defect and the proximity to free margins a xenograft was deemed most appropriate.  The graft was then trimmed to fit the size of the defect.  The graft was then placed in the primary defect and oriented appropriately.
Consent (Marginal Mandibular)/Introductory Paragraph: The rationale for Mohs was explained to the patient and consent was obtained. The risks, benefits and alternatives to therapy were discussed in detail. Specifically, the risks of damage to the marginal mandibular branch of the facial nerve, infection, scarring, bleeding, prolonged wound healing, incomplete removal, allergy to anesthesia, and recurrence were addressed. Prior to the procedure, the treatment site was clearly identified and confirmed by the patient. All components of Universal Protocol/PAUSE Rule completed.
Width Of Defect Perpendicular To Closure In Cm (Required): 1.4
Advancement Flap (Double) Text: The defect edges were debeveled with a #15 scalpel blade. Given the location of the defect and the proximity to free margins a double advancement flap was deemed most appropriate. Using a sterile surgical marker, the appropriate advancement flaps were drawn incorporating the defect and placing the expected incisions within the relaxed skin tension lines where possible. The area thus outlined was incised deep to adipose tissue with a #15 scalpel blade. The skin margins were undermined to an appropriate distance in all directions utilizing iris scissors. Following this, the designed flaps were advanced and carried over into the primary defect and sutured into place.
Crescentic Complex Repair Preamble Text (Leave Blank If You Do Not Want): Extensive wide undermining was performed.
Split-Thickness Skin Graft Text: The defect edges were debeveled with a #15 scalpel blade. Given the location of the defect, shape of the defect and the proximity to free margins a split thickness skin graft was deemed most appropriate. Using a sterile surgical marker, the primary defect shape was transferred to the donor site. The split thickness graft was then harvested.  The skin graft was then placed in the primary defect and oriented appropriately.
Bcc Infiltrative Histology Text: There were numerous aggregates of basaloid cells demonstrating an infiltrative pattern.
Special Stains Stage 15 - Results: Base On Clearance Noted Above
Hemigard Intro: Due to skin fragility and wound tension, it was decided to use HEMIGARD adhesive retention suture devices to permit a linear closure. The skin was cleaned and dried for a 6cm distance away from the wound. Excessive hair, if present, was removed to allow for adhesion.
Surgical Defect Width In Cm (Optional): 0.8
Epidermal Closure Graft Donor Site (Optional): simple interrupted
Stage 2: Additional Anesthesia Type: 1% lidocaine with epinephrine and a 1:10 solution of 8.4% sodium bicarbonate
Mohs Method Verbiage: An incision at a 45 degree angle following the standard Mohs approach was done and the specimen was harvested as a microscopic controlled layer.
Melolabial Interpolation Flap Text: A decision was made to reconstruct the defect utilizing an interpolation axial flap and a staged reconstruction.  A telfa template was made of the defect.  This telfa template was then used to outline the melolabial interpolation flap.  The donor area for the pedicle flap was then injected with anesthesia.  The flap was excised through the skin and subcutaneous tissue down to the layer of the underlying musculature.  The pedicle flap was carefully excised within this deep plane to maintain its blood supply.  The edges of the donor site were undermined.   The donor site was closed in a primary fashion.  The pedicle was then rotated into position and sutured.  Once the tube was sutured into place, adequate blood supply was confirmed with blanching and refill.  The pedicle was then wrapped with xeroform gauze and dressed appropriately with a telfa and gauze bandage to ensure continued blood supply and protect the attached pedicle.
Zygomaticofacial Flap Text: Given the location of the defect, shape of the defect and the proximity to free margins a zygomaticofacial flap was deemed most appropriate for repair. Using a sterile surgical marker, the appropriate flap was drawn incorporating the defect and placing the expected incisions within the relaxed skin tension lines where possible. The area thus outlined was incised deep to adipose tissue with a #15 scalpel blade with preservation of a vascular pedicle.  The skin margins were undermined to an appropriate distance in all directions utilizing iris scissors. The flap was then carried over into the defect and anchored with interrupted buried subcutaneous sutures.
Provider Procedure Text (C): After obtaining clear surgical margins the defect was repaired by another provider.
O-L Flap Text: The defect edges were debeveled with a #15 scalpel blade. Given the location of the defect, shape of the defect and the proximity to free margins an O-L flap was deemed most appropriate. Using a sterile surgical marker, an appropriate advancement flap was drawn incorporating the defect and placing the expected incisions within the relaxed skin tension lines where possible. The area thus outlined was incised deep to adipose tissue with a #15 scalpel blade. The skin margins were undermined to an appropriate distance in all directions utilizing iris scissors. Following this, the designed flap was advanced and carried over into the primary defect and sutured into place.
Which Instrument Did You Use For Dermabrasion?: Wire Brush
Closure 3 Information: This tab is for additional flaps and grafts above and beyond our usual structured repairs.  Please note if you enter information here it will not currently bill and you will need to add the billing information manually.
Post-Care Instructions: I reviewed with the patient in detail post-care instructions. Patient is not to engage in any heavy lifting, exercise, or swimming for the next 14 days. Should the patient develop any fevers, chills, bleeding, severe pain patient will contact the office immediately.
Spiral Flap Text: The defect edges were debeveled with a #15 scalpel blade. Given the location of the defect, shape of the defect and the proximity to free margins a spiral flap was deemed most appropriate. Using a sterile surgical marker, an appropriate rotation flap was drawn incorporating the defect and placing the expected incisions within the relaxed skin tension lines where possible. The area thus outlined was incised deep to adipose tissue with a #15 scalpel blade. The skin margins were undermined to an appropriate distance in all directions utilizing iris scissors. Following this, the designed flap was carried over into the primary defect and sutured into place.
Depth Of Tumor Invasion (For Histology): epidermis
H Plasty Text: Given the location of the defect, shape of the defect and the proximity to free margins a H-plasty was deemed most appropriate for repair. Using a sterile surgical marker, the appropriate advancement arms of the H-plasty were drawn incorporating the defect and placing the expected incisions within the relaxed skin tension lines where possible. The area thus outlined was incised deep to adipose tissue with a #15 scalpel blade. The skin margins were undermined to an appropriate distance in all directions utilizing iris scissors.  The opposing advancement arms were then advanced and carried over into place in opposite direction and anchored with interrupted buried subcutaneous sutures.
Localized Dermabrasion With Sand Papertext: The patient was draped in routine manner.  Localized dermabrasion using sterile sand paper was performed in routine manner to papillary dermis. This spot dermabrasion is being performed to complete skin cancer reconstruction. It also will eliminate the other sun damaged precancerous cells that are known to be part of the regional effect of a lifetime's worth of sun exposure. This localized dermabrasion is therapeutic and should not be considered cosmetic in any regard.
Consent (Nose)/Introductory Paragraph: The rationale for Mohs was explained to the patient and consent was obtained. The risks, benefits and alternatives to therapy were discussed in detail. Specifically, the risks of nasal deformity, changes in the flow of air through the nose, infection, scarring, bleeding, prolonged wound healing, incomplete removal, allergy to anesthesia, nerve injury and recurrence were addressed. Prior to the procedure, the treatment site was clearly identified and confirmed by the patient. All components of Universal Protocol/PAUSE Rule completed.
Muscle Hinge Flap Text: The defect edges were debeveled with a #15 scalpel blade.  Given the size, depth and location of the defect and the proximity to free margins a muscle hinge flap was deemed most appropriate. Using a sterile surgical marker, an appropriate hinge flap was drawn incorporating the defect. The area thus outlined was incised with a #15 scalpel blade. The skin margins were undermined to an appropriate distance in all directions utilizing iris scissors. Following this, the designed flap was carried into the primary defect and sutured into place.
Dorsal Nasal Flap Text: The defect edges were debeveled with a #15 scalpel blade. Given the location of the defect and the proximity to free margins a dorsal nasal flap was deemed most appropriate. Using a sterile surgical marker, an appropriate dorsal nasal flap was drawn around the defect. The area thus outlined was incised deep to adipose tissue with a #15 scalpel blade. The skin margins were undermined to an appropriate distance in all directions utilizing iris scissors. Following this, the designed flap was carried into the primary defect and sutured into place.
Crescentic Intermediate Repair Preamble Text (Leave Blank If You Do Not Want): Undermining was performed with blunt dissection.
Localized Dermabrasion With Wire Brush Text: The patient was draped in routine manner.  Localized dermabrasion using 3 x 17 mm wire brush was performed in routine manner to papillary dermis. This spot dermabrasion is being performed to complete skin cancer reconstruction. It also will eliminate the other sun damaged precancerous cells that are known to be part of the regional effect of a lifetime's worth of sun exposure. This localized dermabrasion is therapeutic and should not be considered cosmetic in any regard.
Mustarde Flap Text: The defect edges were debeveled with a #15 scalpel blade.  Given the size, depth and location of the defect and the proximity to free margins a Mustarde flap was deemed most appropriate. Using a sterile surgical marker, an appropriate flap was drawn incorporating the defect. The area thus outlined was incised with a #15 scalpel blade. The skin margins were undermined to an appropriate distance in all directions utilizing iris scissors. Following this, the designed flap was carried into the primary defect and sutured into place.
Hemigard Retention Suture: 2-0 Nylon
Staged Advancement Flap Text: The defect edges were debeveled with a #15 scalpel blade. Given the location of the defect, shape of the defect and the proximity to free margins a staged advancement flap was deemed most appropriate. Using a sterile surgical marker, an appropriate advancement flap was drawn incorporating the defect and placing the expected incisions within the relaxed skin tension lines where possible. The area thus outlined was incised deep to adipose tissue with a #15 scalpel blade. The skin margins were undermined to an appropriate distance in all directions utilizing iris scissors. Following this, the designed flap was carried over into the primary defect and sutured into place.
Double Island Pedicle Flap Text: The defect edges were debeveled with a #15 scalpel blade. Given the location of the defect, shape of the defect and the proximity to free margins a double island pedicle advancement flap was deemed most appropriate. Using a sterile surgical marker, an appropriate advancement flap was drawn incorporating the defect, outlining the appropriate donor tissue and placing the expected incisions within the relaxed skin tension lines where possible. The area thus outlined was incised deep to adipose tissue with a #15 scalpel blade. The skin margins were undermined to an appropriate distance in all directions around the primary defect and laterally outward around the island pedicle utilizing iris scissors.  There was minimal undermining beneath the pedicle flap. Following this, the flap was carried over into the primary defect and sutured into place.
Consent (Lip)/Introductory Paragraph: The rationale for Mohs was explained to the patient and consent was obtained. The risks, benefits and alternatives to therapy were discussed in detail. Specifically, the risks of lip deformity, changes in the oral aperture, infection, scarring, bleeding, prolonged wound healing, incomplete removal, allergy to anesthesia, nerve injury and recurrence were addressed. Prior to the procedure, the treatment site was clearly identified and confirmed by the patient. All components of Universal Protocol/PAUSE Rule completed.
Was The Patient On Physician Recommended Anticoagulation Therapy?: Please Select the Appropriate Response
Closure 2 Information: This tab is for additional flaps and grafts, including complex repair and grafts and complex repair and flaps. You can also specify a different location for the additional defect, if the location is the same you do not need to select a new one. We will insert the automated text for the repair you select below just as we do for solitary flaps and grafts. Please note that at this time if you select a location with a different insurance zone you will need to override the ICD10 and CPT if appropriate.
Island Pedicle Flap Text: The defect edges were debeveled with a #15 scalpel blade. Given the location of the defect, shape of the defect and the proximity to free margins an island pedicle advancement flap was deemed most appropriate. Using a sterile surgical marker, an appropriate advancement flap was drawn incorporating the defect, outlining the appropriate donor tissue and placing the expected incisions within the relaxed skin tension lines where possible. The area thus outlined was incised deep to adipose tissue with a #15 scalpel blade. The skin margins were undermined to an appropriate distance in all directions around the primary defect and laterally outward around the island pedicle utilizing iris scissors.  There was minimal undermining beneath the pedicle flap. Following this, the flap was carried over into the primary defect and sutured into place.
Bilateral Helical Rim Advancement Flap Text: The defect edges were debeveled with a #15 blade scalpel.  Given the location of the defect and the proximity to free margins (helical rim) a bilateral helical rim advancement flap was deemed most appropriate. Using a sterile surgical marker, the appropriate advancement flaps were drawn incorporating the defect and placing the expected incisions between the helical rim and antihelix where possible.  The area thus outlined was incised through and through with a #15 scalpel blade.  With a skin hook and iris scissors, the flaps were gently and sharply undermined and freed up. Following this, the designed flaps were placed into the primary defect and sutured into place.
Consent 1/Introductory Paragraph: The rationale for Mohs was explained to the patient and consent was obtained. The risks, benefits and alternatives to therapy were discussed in detail. Specifically, the risks of infection, scarring, bleeding, prolonged wound healing, incomplete removal, allergy to anesthesia, nerve injury and recurrence were addressed. Prior to the procedure, the treatment site was clearly identified and confirmed by the patient. All components of Universal Protocol/PAUSE Rule completed.
Location Indication Override (Is Already Calculated Based On Selected Body Location): Area M
Dermal Autograft Text: The defect edges were debeveled with a #15 scalpel blade. Given the location of the defect, shape of the defect and the proximity to free margins a dermal autograft was deemed most appropriate. Using a sterile surgical marker, the primary defect shape was transferred to the donor site. The area thus outlined was incised deep to adipose tissue with a #15 scalpel blade.  The harvested graft was then trimmed of adipose and epidermal tissue until only dermis was left.  The skin graft was then placed in the primary defect and oriented appropriately.
Number Of Stages: 1
Initial Size Of Lesion: 0.5
Subsequent Stages Histo Method Verbiage: Using a similar technique to that described above, a thin layer of tissue was removed from all areas where tumor was visible on the previous stage.  The tissue was again oriented, mapped, dyed, and processed as above.
Mart-1 - Positive Histology Text: MART-1 staining demonstrates areas of higher density and clustering of melanocytes with Pagetoid spread upwards within the epidermis. The surgical margins are positive for tumor cells.
Cheiloplasty (Complex) Text: A decision was made to reconstruct the defect with a  cheiloplasty.  The defect was undermined extensively.  Additional orbicularis oris muscle was excised with a 15 blade scalpel.  The defect was converted into a full thickness wedge to facilite a better cosmetic result.  Small vessels were then tied off with 5-0 monocyrl. The orbicularis oris, superficial fascia, adipose and dermis were then reapproximated.  After the deeper layers were approximated the epidermis was reapproximated with particular care given to realign the vermilion border.
Area L Indication Text: Tumors in this location are included in Area L (trunk and extremities).  Mohs surgery is indicated for larger tumors, or tumors with aggressive histologic features, in these anatomic locations.
Wound Check: 14 days
Brow Lift Text: A midfrontal incision was made medially to the defect to allow access to the tissues just superior to the left eyebrow. Following careful dissection inferiorly in a supraperiosteal plane to the level of the left eyebrow, several 3-0 monocryl sutures were used to resuspend the eyebrow orbicularis oculi muscular unit to the superior frontal bone periosteum. This resulted in an appropriate reapproximation of static eyebrow symmetry and correction of the left brow ptosis.
Cheek-To-Nose Interpolation Flap Text: A decision was made to reconstruct the defect utilizing an interpolation axial flap and a staged reconstruction.  A telfa template was made of the defect.  This telfa template was then used to outline the Cheek-To-Nose Interpolation flap.  The donor area for the pedicle flap was then injected with anesthesia.  The flap was excised through the skin and subcutaneous tissue down to the layer of the underlying musculature.  The interpolation flap was carefully excised within this deep plane to maintain its blood supply.  The edges of the donor site were undermined.   The donor site was closed in a primary fashion.  The pedicle was then rotated into position and sutured.  Once the tube was sutured into place, adequate blood supply was confirmed with blanching and refill.  The pedicle was then wrapped with xeroform gauze and dressed appropriately with a telfa and gauze bandage to ensure continued blood supply and protect the attached pedicle.
V-Y Flap Text: The defect edges were debeveled with a #15 scalpel blade. Given the location of the defect, shape of the defect and the proximity to free margins a V-Y flap was deemed most appropriate. Using a sterile surgical marker, an appropriate advancement flap was drawn incorporating the defect and placing the expected incisions within the relaxed skin tension lines where possible. The area thus outlined was incised deep to adipose tissue with a #15 scalpel blade. The skin margins were undermined to an appropriate distance in all directions utilizing iris scissors. Following this, the designed flap was advanced and carried over into the primary defect and sutured into place.
Partial Purse String (Intermediate) Text: Given the location of the defect and the characteristics of the surrounding skin an intermediate purse string closure was deemed most appropriate.  Undermining was performed circumferentially around the surgical defect.  A purse string suture was then placed and tightened. Wound tension only allowed a partial closure of the circular defect.
Nasolabial Transposition Flap Text: The defect edges were debeveled with a #15 scalpel blade.  Given the size, depth and location of the defect and the proximity to free margins a nasolabial transposition flap was deemed most appropriate. Using a sterile surgical marker, an appropriate flap was drawn incorporating the defect. The area thus outlined was incised with a #15 scalpel blade. The skin margins were undermined to an appropriate distance in all directions utilizing iris scissors. Following this, the designed flap was carried into the primary defect and sutured into place.
Ear Star Wedge Flap Text: The defect edges were debeveled with a #15 blade scalpel.  Given the location of the defect and the proximity to free margins (helical rim) an ear star wedge flap was deemed most appropriate. Using a sterile surgical marker, the appropriate flap was drawn incorporating the defect and placing the expected incisions between the helical rim and antihelix where possible.  The area thus outlined was incised through and through with a #15 scalpel blade. Following this, the designed flap was carried over into the primary defect and sutured into place.
Tarsorrhaphy Text: A tarsorrhaphy was performed using Frost sutures.
Orbicularis Oris Muscle Flap Text: The defect edges were debeveled with a #15 scalpel blade.  Given that the defect affected the competency of the oral sphincter an orbicularis oris muscle flap was deemed most appropriate to restore this competency and normal muscle function.  Using a sterile surgical marker, an appropriate flap was drawn incorporating the defect. The area thus outlined was incised with a #15 scalpel blade. Following this, the designed flap was carried over into the primary defect and sutured into place.
V-Y Plasty Text: The defect edges were debeveled with a #15 scalpel blade. Given the location of the defect, shape of the defect and the proximity to free margins an V-Y advancement flap was deemed most appropriate. Using a sterile surgical marker, an appropriate advancement flap was drawn incorporating the defect and placing the expected incisions within the relaxed skin tension lines where possible. The area thus outlined was incised deep to adipose tissue with a #15 scalpel blade. The skin margins were undermined to an appropriate distance in all directions utilizing iris scissors. Following this, the designed flap was advanced and carried over into the primary defect and sutured into place.
Flip-Flop Flap Text: The defect edges were debeveled with a #15 blade scalpel.  Given the location of the defect and the proximity to free margins a flip-flop flap was deemed most appropriate. Using a sterile surgical marker, the appropriate flap was drawn incorporating the defect and placing the expected incisions between the helical rim and antihelix where possible.  The area thus outlined was incised through and through with a #15 scalpel blade. Following this, the designed flap was carried over into the primary defect and sutured into place.
Referring Physician (Optional): Puja Simpson
Intermediate Repair And Flap Additional Text (Will Appearing After The Standard Complex Repair Text): The intermediate repair was not sufficient to completely close the primary defect. The remaining additional defect was repaired with the flap mentioned below.
Non-Graft Cartilage Fenestration Text: The cartilage was fenestrated with a 2mm punch biopsy to help facilitate healing.
Mercedes Flap Text: The defect edges were debeveled with a #15 scalpel blade. Given the location of the defect, shape of the defect and the proximity to free margins a Mercedes flap was deemed most appropriate. Using a sterile surgical marker, an appropriate advancement flap was drawn incorporating the defect and placing the expected incisions within the relaxed skin tension lines where possible. The area thus outlined was incised deep to adipose tissue with a #15 scalpel blade. The skin margins were undermined to an appropriate distance in all directions utilizing iris scissors. Following this, the designed flap was advanced and carried over into the primary defect and sutured into place.
Rhombic Flap Text: The defect edges were debeveled with a #15 scalpel blade. Given the location of the defect and the proximity to free margins a rhombic flap was deemed most appropriate. Using a sterile surgical marker, an appropriate rhombic flap was drawn incorporating the defect. The area thus outlined was incised deep to adipose tissue with a #15 scalpel blade. The skin margins were undermined to an appropriate distance in all directions utilizing iris scissors. Following this, the designed flap was carried over into the primary defect and sutured into place.
Home Suture Removal Text: Patient was provided instructions on removing sutures and will remove their sutures at home.  If they have any questions or difficulties they will call the office.
Deep Sutures: 3-0 Maxon
Burow's Advancement Flap Text: The defect edges were debeveled with a #15 scalpel blade. Given the location of the defect and the proximity to free margins a Burow's advancement flap was deemed most appropriate. Using a sterile surgical marker, the appropriate advancement flap was drawn incorporating the defect and placing the expected incisions within the relaxed skin tension lines where possible. The area thus outlined was incised deep to adipose tissue with a #15 scalpel blade. The skin margins were undermined to an appropriate distance in all directions utilizing iris scissors. Following this, the designed flap was advanced and carried over into the primary defect and sutured into place.
Consent (Spinal Accessory)/Introductory Paragraph: The rationale for Mohs was explained to the patient and consent was obtained. The risks, benefits and alternatives to therapy were discussed in detail. Specifically, the risks of damage to the spinal accessory nerve, infection, scarring, bleeding, prolonged wound healing, incomplete removal, allergy to anesthesia, and recurrence were addressed. Prior to the procedure, the treatment site was clearly identified and confirmed by the patient. All components of Universal Protocol/PAUSE Rule completed.
Estimated Blood Loss (Cc): minimal
Distance Of Undermining In Cm (Required): 1.6
Postop Diagnosis: same
Burow's Graft Text: The defect edges were debeveled with a #15 scalpel blade. Given the location of the defect, shape of the defect, the proximity to free margins and the presence of a standing cone deformity a Burow's skin graft was deemed most appropriate. The standing cone was removed and this tissue was then trimmed to the shape of the primary defect. The adipose tissue was also removed until only dermis and epidermis were left.  The skin graft was then placed in the primary defect and oriented appropriately.
Hemigard Postcare Instructions: The HEMIGARD strips are to remain completely dry for at least 5-7 days.
Length To Time In Minutes Device Was In Place: 10
Dressing: pressure dressing with telfa
Advancement-Rotation Flap Text: The defect edges were debeveled with a #15 scalpel blade. Given the location of the defect, shape of the defect and the proximity to free margins an advancement-rotation flap was deemed most appropriate. Using a sterile surgical marker, an appropriate flap was drawn incorporating the defect and placing the expected incisions within the relaxed skin tension lines where possible. The area thus outlined was incised deep to adipose tissue with a #15 scalpel blade. The skin margins were undermined to an appropriate distance in all directions utilizing iris scissors. Following this, the designed flap was carried over into the primary defect and sutured into place.
Pain Refusal Text: I offered to prescribe pain medication but the patient refused to take this medication.
Mastoid Interpolation Flap Text: A decision was made to reconstruct the defect utilizing an interpolation axial flap and a staged reconstruction.  A telfa template was made of the defect.  This telfa template was then used to outline the mastoid interpolation flap.  The donor area for the pedicle flap was then injected with anesthesia.  The flap was excised through the skin and subcutaneous tissue down to the layer of the underlying musculature.  The pedicle flap was carefully excised within this deep plane to maintain its blood supply.  The edges of the donor site were undermined.   The donor site was closed in a primary fashion.  The pedicle was then rotated into position and sutured.  Once the tube was sutured into place, adequate blood supply was confirmed with blanching and refill.  The pedicle was then wrapped with xeroform gauze and dressed appropriately with a telfa and gauze bandage to ensure continued blood supply and protect the attached pedicle.
Surgeon/Pathologist Verbiage (Will Incorporate Name Of Surgeon From Intro If Not Blank): operated in two distinct and integrated capacities as the surgeon and pathologist.
O-Z Flap Text: The defect edges were debeveled with a #15 scalpel blade. Given the location of the defect, shape of the defect and the proximity to free margins an O-Z flap was deemed most appropriate. Using a sterile surgical marker, an appropriate transposition flap was drawn incorporating the defect and placing the expected incisions within the relaxed skin tension lines where possible. The area thus outlined was incised deep to adipose tissue with a #15 scalpel blade. The skin margins were undermined to an appropriate distance in all directions utilizing iris scissors. Following this, the designed flap was carried over into the primary defect and sutured into place.
Which Eyelid Repair Cpt Are You Using?: 05601
Abbe Flap (Lower To Upper Lip) Text: The defect of the upper lip was assessed and measured.  Given the location and size of the defect, an Abbe flap was deemed most appropriate. Using a sterile surgical marker, an appropriate Abbe flap was measured and drawn on the lower lip. Local anesthesia was then infiltrated. A scalpel was then used to incise the upper lip through and through the skin, vermilion, muscle and mucosa, leaving the flap pedicled on the opposite side.  The flap was then rotated and transferred to the lower lip defect.  The flap was then sutured into place with a three layer technique, closing the orbicularis oris muscle layer with subcutaneous buried sutures, followed by a mucosal layer and an epidermal layer.
Perineural Invasion (For Histology - Be Specific If Possible): absent
O-Z Plasty Text: The defect edges were debeveled with a #15 scalpel blade. Given the location of the defect, shape of the defect and the proximity to free margins an O-Z plasty (double transposition flap) was deemed most appropriate. Using a sterile surgical marker, the appropriate transposition flaps were drawn incorporating the defect and placing the expected incisions within the relaxed skin tension lines where possible. The area thus outlined was incised deep to adipose tissue with a #15 scalpel blade. The skin margins were undermined to an appropriate distance in all directions utilizing iris scissors. Hemostasis was achieved with electrocautery. The flaps were then transposed and carried over into place, one clockwise and the other counterclockwise, and anchored with interrupted buried subcutaneous sutures.
Purse String (Simple) Text: Given the location of the defect and the characteristics of the surrounding skin a purse string closure was deemed most appropriate.  Undermining was performed circumferentially around the surgical defect.  A purse string suture was then placed and tightened.
Star Wedge Flap Text: The defect edges were debeveled with a #15 scalpel blade. Given the location of the defect, shape of the defect and the proximity to free margins a star wedge flap was deemed most appropriate. Using a sterile surgical marker, an appropriate rotation flap was drawn incorporating the defect and placing the expected incisions within the relaxed skin tension lines where possible. The area thus outlined was incised deep to adipose tissue with a #15 scalpel blade. The skin margins were undermined to an appropriate distance in all directions utilizing iris scissors. Following this, the designed flap was carried over into the primary defect and sutured into place.
Nasal Turnover Hinge Flap Text: The defect edges were debeveled with a #15 scalpel blade.  Given the size, depth, location of the defect and the defect being full thickness a nasal turnover hinge flap was deemed most appropriate. Using a sterile surgical marker, an appropriate hinge flap was drawn incorporating the defect. The area thus outlined was incised with a #15 scalpel blade. The flap was designed to recreate the nasal mucosal lining and the alar rim. The skin margins were undermined to an appropriate distance in all directions utilizing iris scissors. Following this, the designed flap was carried over into the primary defect and sutured into place
Double O-Z Flap Text: The defect edges were debeveled with a #15 scalpel blade. Given the location of the defect, shape of the defect and the proximity to free margins a Double O-Z flap was deemed most appropriate. Using a sterile surgical marker, an appropriate transposition flap was drawn incorporating the defect and placing the expected incisions within the relaxed skin tension lines where possible. The area thus outlined was incised deep to adipose tissue with a #15 scalpel blade. The skin margins were undermined to an appropriate distance in all directions utilizing iris scissors. Following this, the designed flap was carried over into the primary defect and sutured into place.
Consent (Scalp)/Introductory Paragraph: The rationale for Mohs was explained to the patient and consent was obtained. The risks, benefits and alternatives to therapy were discussed in detail. Specifically, the risks of changes in hair growth pattern secondary to repair, infection, scarring, bleeding, prolonged wound healing, incomplete removal, allergy to anesthesia, nerve injury and recurrence were addressed. Prior to the procedure, the treatment site was clearly identified and confirmed by the patient. All components of Universal Protocol/PAUSE Rule completed.
Island Pedicle Flap With Canthal Suspension Text: The defect edges were debeveled with a #15 scalpel blade. Given the location of the defect, shape of the defect and the proximity to free margins an island pedicle advancement flap was deemed most appropriate. Using a sterile surgical marker, an appropriate advancement flap was drawn incorporating the defect, outlining the appropriate donor tissue and placing the expected incisions within the relaxed skin tension lines where possible. The area thus outlined was incised deep to adipose tissue with a #15 scalpel blade. The skin margins were undermined to an appropriate distance in all directions around the primary defect and laterally outward around the island pedicle utilizing iris scissors.  There was minimal undermining beneath the pedicle flap. A suspension suture was placed in the canthal tendon to prevent tension and prevent ectropion. Following this, the designed flap was placed into the primary defect and sutured into place.
Bilateral Rotation Flap Text: The defect edges were debeveled with a #15 scalpel blade. Given the location of the defect, shape of the defect and the proximity to free margins a bilateral rotation flap was deemed most appropriate. Using a sterile surgical marker, an appropriate rotation flap was drawn incorporating the defect and placing the expected incisions within the relaxed skin tension lines where possible. The area thus outlined was incised deep to adipose tissue with a #15 scalpel blade. The skin margins were undermined to an appropriate distance in all directions utilizing iris scissors. Following this, the designed flap was carried over into the primary defect and sutured into place.
Previous Accession (Optional): [id number]
Consent 2/Introductory Paragraph: Mohs surgery was explained to the patient and consent was obtained. The risks, benefits and alternatives to therapy were discussed in detail. Specifically, the risks of infection, scarring, bleeding, prolonged wound healing, incomplete removal, allergy to anesthesia, nerve injury and recurrence were addressed. Prior to the procedure, the treatment site was clearly identified and confirmed by the patient. All components of Universal Protocol/PAUSE Rule completed.
Ftsg Text: The defect edges were debeveled with a #15 scalpel blade. Given the location of the defect, shape of the defect and the proximity to free margins a full thickness skin graft was deemed most appropriate. Using a sterile surgical marker, the primary defect shape was transferred to the donor site. The area thus outlined was incised deep to adipose tissue with a #15 scalpel blade.  The harvested graft was then trimmed of adipose tissue until only dermis and epidermis was left.  The skin graft was then placed in the primary defect and oriented appropriately.
Mohs Rapid Report Verbiage: The area of clinically evident tumor was marked with skin marking ink and appropriately hatched.  The initial incision was made following the Mohs approach through the skin.  The specimen was taken to the lab, divided into the necessary number of pieces, chromacoded and processed according to the Mohs protocol.  This was repeated in successive stages until a tumor free defect was achieved.
Cheiloplasty (Less Than 50%) Text: A decision was made to reconstruct the defect with a  cheiloplasty.  The defect was undermined extensively.  Additional orbicularis oris muscle was excised with a 15 blade scalpel.  The defect was converted into a full thickness wedge, of less than 50% of the vertical height of the lip, to facilite a better cosmetic result.  Small vessels were then tied off with 5-0 monocyrl. The orbicularis oris, superficial fascia, adipose and dermis were then reapproximated.  After the deeper layers were approximated the epidermis was reapproximated with particular care given to realign the vermilion border.
Estlander Flap (Lower To Upper Lip) Text: The defect of the lower lip was assessed and measured.  Given the location and size of the defect, an Estlander flap was deemed most appropriate. Using a sterile surgical marker, an appropriate Estlander flap was measured and drawn on the upper lip. Local anesthesia was then infiltrated. A scalpel was then used to incise the lateral aspect of the flap, through skin, muscle and mucosa, leaving the flap pedicled medially.  The flap was then rotated and positioned to fill the lower lip defect.  The flap was then sutured into place with a three layer technique, closing the orbicularis oris muscle layer with subcutaneous buried sutures, followed by a mucosal layer and an epidermal layer.
Manual Repair Warning Statement: We plan on removing the manually selected variable below in favor of our much easier automatic structured text blocks found in the previous tab. We decided to do this to help make the flow better and give you the full power of structured data. Manual selection is never going to be ideal in our platform and I would encourage you to avoid using manual selection from this point on, especially since I will be sunsetting this feature. It is important that you do one of two things with the customized text below. First, you can save all of the text in a word file so you can have it for future reference. Second, transfer the text to the appropriate area in the Library tab. Lastly, if there is a flap or graft type which we do not have you need to let us know right away so I can add it in before the variable is hidden. No need to panic, we plan to give you roughly 6 months to make the change.
Repair Anesthesia Method: local infiltration
W Plasty Text: The lesion was extirpated to the level of the fat with a #15 scalpel blade. Given the location of the defect, shape of the defect and the proximity to free margins a W-plasty was deemed most appropriate for repair. Using a sterile surgical marker, the appropriate transposition arms of the W-plasty were drawn incorporating the defect and placing the expected incisions within the relaxed skin tension lines where possible. The area thus outlined was incised deep to adipose tissue with a #15 scalpel blade. The skin margins were undermined to an appropriate distance in all directions utilizing iris scissors. The opposing transposition arms were then transposed and carried over into place in opposite direction and anchored with interrupted buried subcutaneous sutures.
Hatchet Flap Text: The defect edges were debeveled with a #15 scalpel blade. Given the location of the defect, shape of the defect and the proximity to free margins a hatchet flap was deemed most appropriate. Using a sterile surgical marker, an appropriate hatchet flap was drawn incorporating the defect and placing the expected incisions within the relaxed skin tension lines where possible. The area thus outlined was incised deep to adipose tissue with a #15 scalpel blade. The skin margins were undermined to an appropriate distance in all directions utilizing iris scissors. Following this, the designed flap was carried over into the primary defect and sutured into place.
O-T Advancement Flap Text: The defect edges were debeveled with a #15 scalpel blade. Given the location of the defect, shape of the defect and the proximity to free margins an O-T advancement flap was deemed most appropriate. Using a sterile surgical marker, an appropriate advancement flap was drawn incorporating the defect and placing the expected incisions within the relaxed skin tension lines where possible. The area thus outlined was incised deep to adipose tissue with a #15 scalpel blade. The skin margins were undermined to an appropriate distance in all directions utilizing iris scissors. Following this, the designed flap was advanced and carried over into the primary defect and sutured into place.
Secondary Intention Text (Leave Blank If You Do Not Want): The defect will heal with secondary intention.
Consent Type: Consent 1 (Standard)
Intermediate Repair And Graft Additional Text (Will Appearing After The Standard Complex Repair Text): The intermediate repair was not sufficient to completely close the primary defect. The remaining additional defect was repaired with the graft mentioned below.
Modified Advancement Flap Text: The defect edges were debeveled with a #15 scalpel blade. Given the location of the defect, shape of the defect and the proximity to free margins a modified advancement flap was deemed most appropriate. Using a sterile surgical marker, an appropriate advancement flap was drawn incorporating the defect and placing the expected incisions within the relaxed skin tension lines where possible. The area thus outlined was incised deep to adipose tissue with a #15 scalpel blade. The skin margins were undermined to an appropriate distance in all directions utilizing iris scissors. Following this, the designed flap was advanced and carried over into the primary defect and sutured into place.
Repair Type: Complex Repair
Rhomboid Transposition Flap Text: The defect edges were debeveled with a #15 scalpel blade. Given the location of the defect and the proximity to free margins a rhomboid transposition flap was deemed most appropriate. Using a sterile surgical marker, an appropriate rhomboid flap was drawn incorporating the defect. The area thus outlined was incised deep to adipose tissue with a #15 scalpel blade. The skin margins were undermined to an appropriate distance in all directions utilizing iris scissors. Following this, the designed flap was carried over into the primary defect and sutured into place.
Complex Repair And Flap Additional Text (Will Appearing After The Standard Complex Repair Text): The complex repair was not sufficient to completely close the primary defect. The remaining additional defect was repaired with the flap mentioned below.
Chonodrocutaneous Helical Advancement Flap Text: The defect edges were debeveled with a #15 scalpel blade. Given the location of the defect and the proximity to free margins a chondrocutaneous helical advancement flap was deemed most appropriate. Using a sterile surgical marker, the appropriate advancement flap was drawn incorporating the defect and placing the expected incisions within the relaxed skin tension lines where possible. The area thus outlined was incised deep to adipose tissue with a #15 scalpel blade. The skin margins were undermined to an appropriate distance in all directions utilizing iris scissors. Following this, the designed flap was advanced and carried over into the primary defect and sutured into place.
Consent (Near Eyelid Margin)/Introductory Paragraph: The rationale for Mohs was explained to the patient and consent was obtained. The risks, benefits and alternatives to therapy were discussed in detail. Specifically, the risks of ectropion or eyelid deformity, infection, scarring, bleeding, prolonged wound healing, incomplete removal, allergy to anesthesia, nerve injury and recurrence were addressed. Prior to the procedure, the treatment site was clearly identified and confirmed by the patient. All components of Universal Protocol/PAUSE Rule completed.
Graft Donor Site Bandage (Optional-Leave Blank If You Don't Want In Note): Steri-strips and a pressure bandage were applied to the donor site.
Body Location Override (Optional - Billing Will Still Be Based On Selected Body Map Location If Applicable): mid upper forehead
Undermining Type: Entire Wound
Anesthesia Volume In Cc: 7.9
Cartilage Graft Text: The defect edges were debeveled with a #15 scalpel blade. Given the location of the defect, shape of the defect, the fact the defect involved a full thickness cartilage defect a cartilage graft was deemed most appropriate.  An appropriate donor site was identified, cleansed, and anesthetized. The cartilage graft was then harvested and transferred to the recipient site, oriented appropriately and then sutured into place.  The secondary defect was then repaired using a primary closure.
Alar Island Pedicle Flap Text: The defect edges were debeveled with a #15 scalpel blade. Given the location of the defect, shape of the defect and the proximity to the alar rim an island pedicle advancement flap was deemed most appropriate. Using a sterile surgical marker, an appropriate advancement flap was drawn incorporating the defect, outlining the appropriate donor tissue and placing the expected incisions within the nasal ala running parallel to the alar rim. The area thus outlined was incised with a #15 scalpel blade. The skin margins were undermined minimally to an appropriate distance in all directions around the primary defect and laterally outward around the island pedicle utilizing iris scissors.  There was minimal undermining beneath the pedicle flap. Following this, the designed flap was carried over into the primary defect and sutured into place.
Donor Site Anesthesia Type: same as repair anesthesia
Helical Rim Text: The closure involved the helical rim.
Mauc Instructions: By selecting yes to the question below the MAUC number will be added into the note.  This will be calculated automatically based on the diagnosis chosen, the size entered, the body zone selected (H,M,L) and the specific indications you chose. You will also have the option to override the Mohs AUC if you disagree with the automatically calculated number and this option is found in the Case Summary tab.
Anesthesia Volume In Cc: 3.3
Information: Selecting Yes will display possible errors in your note based on the variables you have selected. This validation is only offered as a suggestion for you. PLEASE NOTE THAT THE VALIDATION TEXT WILL BE REMOVED WHEN YOU FINALIZE YOUR NOTE. IF YOU WANT TO FAX A PRELIMINARY NOTE YOU WILL NEED TO TOGGLE THIS TO 'NO' IF YOU DO NOT WANT IT IN YOUR FAXED NOTE.
Same Histology In Subsequent Stages Text: The pattern and morphology of the tumor is as described in the first stage.
Paramedian Forehead Flap Text: A decision was made to reconstruct the defect utilizing an interpolation axial flap and a staged reconstruction.  A telfa template was made of the defect.  This telfa template was then used to outline the paramedian forehead pedicle flap.  The donor area for the pedicle flap was then injected with anesthesia.  The flap was excised through the skin and subcutaneous tissue down to the layer of the underlying musculature.  The pedicle flap was carefully excised within this deep plane to maintain its blood supply.  The edges of the donor site were undermined.   The donor site was closed in a primary fashion.  The pedicle was then rotated into position and sutured.  Once the tube was sutured into place, adequate blood supply was confirmed with blanching and refill.  The pedicle was then wrapped with xeroform gauze and dressed appropriately with a telfa and gauze bandage to ensure continued blood supply and protect the attached pedicle.
Mohs Histo Method Verbiage: Each section was then chromacoded and processed in the Mohs lab using the Mohs protocol and submitted for frozen section, utilizing hematoxylin and eosin histochemical stains.
Where Do You Want The Question To Include Opioid Counseling Located?: Case Summary Tab
Repair Hemostasis (Optional): Electrodesiccation
Bill 59 Modifier?: No - Continue to Bill 79 Modifier
Area H Indication Text: Tumors in this location are included in Area H (eyelids, eyebrows, nose, lips, chin, ear, pre-auricular, post-auricular, temple, genitalia, hands, feet, ankles and areola).  Tissue conservation is critical in these anatomic locations.
No Residual Tumor Seen Histology Text: There were no malignant cells seen in the sections examined.
Posterior Auricular Interpolation Flap Text: A decision was made to reconstruct the defect utilizing an interpolation axial flap and a staged reconstruction.  A telfa template was made of the defect.  This telfa template was then used to outline the posterior auricular interpolation flap.  The donor area for the pedicle flap was then injected with anesthesia.  The flap was excised through the skin and subcutaneous tissue down to the layer of the underlying musculature.  The pedicle flap was carefully excised within this deep plane to maintain its blood supply.  The edges of the donor site were undermined.   The donor site was closed in a primary fashion.  The pedicle was then rotated into position and sutured.  Once the tube was sutured into place, adequate blood supply was confirmed with blanching and refill.  The pedicle was then wrapped with xeroform gauze and dressed appropriately with a telfa and gauze bandage to ensure continued blood supply and protect the attached pedicle.
Dressing (No Sutures): dry sterile dressing
Abbe Flap (Upper To Lower Lip) Text: The defect of the lower lip was assessed and measured.  Given the location and size of the defect, an Abbe flap was deemed most appropriate. Using a sterile surgical marker, an appropriate Abbe flap was measured and drawn on the upper lip. Local anesthesia was then infiltrated.  A scalpel was then used to incise the upper lip through and through the skin, vermilion, muscle and mucosa, leaving the flap pedicled on the opposite side.  The flap was then rotated and transferred to the lower lip defect.  The flap was then sutured into place with a three layer technique, closing the orbicularis oris muscle layer with subcutaneous buried sutures, followed by a mucosal layer and an epidermal layer.
Purse String (Intermediate) Text: Given the location of the defect and the characteristics of the surrounding skin a purse string intermediate closure was deemed most appropriate.  Undermining was performed circumferentially around the surgical defect.  A purse string suture was then placed and tightened.
Nasalis-Muscle-Based Myocutaneous Island Pedicle Flap Text: Using a #15 blade, an incision was made around the donor flap to the level of the nasalis muscle. Wide lateral undermining was then performed in both the subcutaneous plane above the nasalis muscle, and in a submuscular plane just above periosteum. This allowed the formation of a free nasalis muscle axial pedicle (based on the angular artery) which was still attached to the actual cutaneous flap, increasing its mobility and vascular viability. Hemostasis was obtained with pinpoint electrocoagulation. The flap was mobilized into position and the pivotal anchor points positioned and stabilized with buried interrupted sutures. Subcutaneous and dermal tissues were closed in a multilayered fashion with sutures. Tissue redundancies were excised, and the epidermal edges were apposed without significant tension and sutured with sutures.
Transposition Flap Text: The defect edges were debeveled with a #15 scalpel blade. Given the location of the defect and the proximity to free margins a transposition flap was deemed most appropriate. Using a sterile surgical marker, an appropriate transposition flap was drawn incorporating the defect. The area thus outlined was incised deep to adipose tissue with a #15 scalpel blade. The skin margins were undermined to an appropriate distance in all directions utilizing iris scissors. Following this, the designed flap was carried over into the primary defect and sutured into place.
Detail Level: Detailed
Double O-Z Plasty Text: The defect edges were debeveled with a #15 scalpel blade. Given the location of the defect, shape of the defect and the proximity to free margins a Double O-Z plasty (double transposition flap) was deemed most appropriate. Using a sterile surgical marker, the appropriate transposition flaps were drawn incorporating the defect and placing the expected incisions within the relaxed skin tension lines where possible. The area thus outlined was incised deep to adipose tissue with a #15 scalpel blade. The skin margins were undermined to an appropriate distance in all directions utilizing iris scissors. Hemostasis was achieved with electrocautery. The flaps were then transposed and carried over into place, one clockwise and the other counterclockwise, and anchored with interrupted buried subcutaneous sutures.
Island Pedicle Flap-Requiring Vessel Identification Text: The defect edges were debeveled with a #15 scalpel blade. Given the location of the defect, shape of the defect and the proximity to free margins an island pedicle advancement flap was deemed most appropriate. Using a sterile surgical marker, an appropriate advancement flap was drawn, based on the axial vessel mentioned above, incorporating the defect, outlining the appropriate donor tissue and placing the expected incisions within the relaxed skin tension lines where possible. The area thus outlined was incised deep to adipose tissue with a #15 scalpel blade. The skin margins were undermined to an appropriate distance in all directions around the primary defect and laterally outward around the island pedicle utilizing iris scissors.  There was minimal undermining beneath the pedicle flap. Following this, the designed flap was carried over into the primary defect and sutured into place.
Consent 3/Introductory Paragraph: I gave the patient a chance to ask questions they had about the procedure.  Following this I explained the Mohs procedure and consent was obtained. The risks, benefits and alternatives to therapy were discussed in detail. Specifically, the risks of infection, scarring, bleeding, prolonged wound healing, incomplete removal, allergy to anesthesia, nerve injury and recurrence were addressed. Prior to the procedure, the treatment site was clearly identified and confirmed by the patient. All components of Universal Protocol/PAUSE Rule completed.
Retention Suture Text: Retention sutures were placed to support the closure and prevent dehiscence.
Pinch Graft Text: The defect edges were debeveled with a #15 scalpel blade. Given the location of the defect, shape of the defect and the proximity to free margins a pinch graft was deemed most appropriate. Using a sterile surgical marker, the primary defect shape was transferred to the donor site. The area thus outlined was incised deep to adipose tissue with a #15 scalpel blade.  The harvested graft was then trimmed of adipose tissue until only dermis and epidermis was left. The skin graft was then placed in the primary defect and oriented appropriately.
Primary Defect Width In Cm (Final Defect Size - Required For Flaps/Grafts): 1.2
Bilobed Flap Text: The defect edges were debeveled with a #15 scalpel blade. Given the location of the defect and the proximity to free margins a bilobe flap was deemed most appropriate. Using a sterile surgical marker, an appropriate bilobe flap drawn around the defect. The area thus outlined was incised deep to adipose tissue with a #15 scalpel blade. The skin margins were undermined to an appropriate distance in all directions utilizing iris scissors. Following this, the designed flap was carried over into the primary defect and sutured into place.
Suturegard Intro: Intraoperative tissue expansion was performed, utilizing the SUTUREGARD device, in order to reduce wound tension.
Bcc Histology Text: There were numerous aggregates of basaloid cells.
Adjacent Tissue Transfer Text: The defect edges were debeveled with a #15 scalpel blade. Given the location of the defect and the proximity to free margins an adjacent tissue transfer was deemed most appropriate. Using a sterile surgical marker, an appropriate flap was drawn incorporating the defect and placing the expected incisions within the relaxed skin tension lines where possible. The area thus outlined was incised deep to adipose tissue with a #15 scalpel blade. The skin margins were undermined to an appropriate distance in all directions utilizing iris scissors and carried over to close the primary defect.
Ear Wedge Repair Text: A wedge excision was completed by carrying down an excision through the full thickness of the ear and cartilage with an inward facing Burow's triangle. The wound was then closed in a layered fashion.
Skin Substitute Text: The defect edges were debeveled with a #15 scalpel blade. Given the location of the defect, shape of the defect and the proximity to free margins a skin substitute graft was deemed most appropriate.  The graft material was trimmed to fit the size of the defect. The graft was then placed in the primary defect and oriented appropriately.
Surgical Defect Length In Cm (Optional): 1.1
Advancement Flap (Single) Text: The defect edges were debeveled with a #15 scalpel blade. Given the location of the defect and the proximity to free margins a single advancement flap was deemed most appropriate. Using a sterile surgical marker, an appropriate advancement flap was drawn incorporating the defect and placing the expected incisions within the relaxed skin tension lines where possible. The area thus outlined was incised deep to adipose tissue with a #15 scalpel blade. The skin margins were undermined to an appropriate distance in all directions utilizing iris scissors. Following this, the designed flap was advanced and carried over into the primary defect and sutured into place.
Suturegard Body: The suture ends were repeatedly re-tightened and re-clamped to achieve the desired tissue expansion.
Full Thickness Lip Wedge Repair (Flap) Text: Given the location of the defect and the proximity to free margins a full thickness wedge repair was deemed most appropriate. Using a sterile surgical marker, the appropriate repair was drawn incorporating the defect and placing the expected incisions perpendicular to the vermilion border.  The vermilion border was also meticulously outlined to ensure appropriate reapproximation during the repair.  The area thus outlined was incised through and through with a #15 scalpel blade.  The muscularis and dermis were reaproximated with deep sutures following hemostasis. Care was taken to realign the vermilion border before proceeding with the superficial closure.  Once the vermilion was realigned the superfical and mucosal closure was finished.
Retention Suture Bite Size: 3 mm
Stage 2: Additional Anesthesia Volume In Cc: 2.6
Debridement Text: The wound edges were debrided prior to proceeding with the closure to facilitate wound healing.
Interpolation Flap Text: A decision was made to reconstruct the defect utilizing an interpolation axial flap and a staged reconstruction.  A telfa template was made of the defect.  This telfa template was then used to outline the interpolation flap.  The donor area for the pedicle flap was then injected with anesthesia.  The flap was excised through the skin and subcutaneous tissue down to the layer of the underlying musculature.  The interpolation flap was carefully excised within this deep plane to maintain its blood supply.  The edges of the donor site were undermined.   The donor site was closed in a primary fashion.  The pedicle was then rotated into position and sutured.  Once the tube was sutured into place, adequate blood supply was confirmed with blanching and refill.  The pedicle was then wrapped with xeroform gauze and dressed appropriately with a telfa and gauze bandage to ensure continued blood supply and protect the attached pedicle.
Eye Protection Verbiage: Before proceeding with the stage, a plastic scleral shield was inserted. The globe was anesthetized with a few drops of 1% lidocaine with 1:100,000 epinephrine. Then, an appropriate sized scleral shield was chosen and coated with lacrilube ointment. The shield was gently inserted and left in place for the duration of each stage. After the stage was completed, the shield was gently removed.
Z Plasty Text: The lesion was extirpated to the level of the fat with a #15 scalpel blade. Given the location of the defect, shape of the defect and the proximity to free margins a Z-plasty was deemed most appropriate for repair. Using a sterile surgical marker, the appropriate transposition arms of the Z-plasty were drawn incorporating the defect and placing the expected incisions within the relaxed skin tension lines where possible. The area thus outlined was incised deep to adipose tissue with a #15 scalpel blade. The skin margins were undermined to an appropriate distance in all directions utilizing iris scissors. The opposing transposition arms were then transposed and carried over into place in opposite direction and anchored with interrupted buried subcutaneous sutures.
O-T Plasty Text: The defect edges were debeveled with a #15 scalpel blade. Given the location of the defect, shape of the defect and the proximity to free margins an O-T plasty was deemed most appropriate. Using a sterile surgical marker, an appropriate O-T plasty was drawn incorporating the defect and placing the expected incisions within the relaxed skin tension lines where possible. The area thus outlined was incised deep to adipose tissue with a #15 scalpel blade. The skin margins were undermined to an appropriate distance in all directions utilizing iris scissors. Following this, the designed flap was carried over into the primary defect and sutured into place.
Localized Dermabrasion With 15 Blade Text: The patient was draped in routine manner.  Localized dermabrasion using a 15 blade was performed in routine manner to papillary dermis. This spot dermabrasion is being performed to complete skin cancer reconstruction. It also will eliminate the other sun damaged precancerous cells that are known to be part of the regional effect of a lifetime's worth of sun exposure. This localized dermabrasion is therapeutic and should not be considered cosmetic in any regard.
No Repair - Repaired With Adjacent Surgical Defect Text (Leave Blank If You Do Not Want): After obtaining clear surgical margins the defect was repaired concurrently with another surgical defect which was in close approximation.
Helical Rim Advancement Flap Text: The defect edges were debeveled with a #15 blade scalpel.  Given the location of the defect and the proximity to free margins (helical rim) a double helical rim advancement flap was deemed most appropriate. Using a sterile surgical marker, the appropriate advancement flaps were drawn incorporating the defect and placing the expected incisions between the helical rim and antihelix where possible.  The area thus outlined was incised through and through with a #15 scalpel blade.  With a skin hook and iris scissors, the flaps were gently and sharply undermined and freed up. Folllowing this, the designed flaps were carried over into the primary defect and sutured into place.
Mucosal Advancement Flap Text: Given the location of the defect, shape of the defect and the proximity to free margins a mucosal advancement flap was deemed most appropriate. Incisions were made with a 15 blade scalpel in the appropriate fashion along the cutaneous vermilion border and the mucosal lip. The remaining actinically damaged mucosal tissue was excised.  The mucosal advancement flap was then elevated to the gingival sulcus with care taken to preserve the neurovascular structures and advanced into the primary defect. Care was taken to ensure that precise realignment of the vermilion border was achieved.
Rotation Flap Text: The defect edges were debeveled with a #15 scalpel blade. Given the location of the defect, shape of the defect and the proximity to free margins a rotation flap was deemed most appropriate. Using a sterile surgical marker, an appropriate rotation flap was drawn incorporating the defect and placing the expected incisions within the relaxed skin tension lines where possible. The area thus outlined was incised deep to adipose tissue with a #15 scalpel blade. The skin margins were undermined to an appropriate distance in all directions utilizing iris scissors. Following this, the designed flap was carried over into the primary defect and sutured into place.
Epidermal Sutures: 3-0 Prolene
Consent (Ear)/Introductory Paragraph: The rationale for Mohs was explained to the patient and consent was obtained. The risks, benefits and alternatives to therapy were discussed in detail. Specifically, the risks of ear deformity, infection, scarring, bleeding, prolonged wound healing, incomplete removal, allergy to anesthesia, nerve injury and recurrence were addressed. Prior to the procedure, the treatment site was clearly identified and confirmed by the patient. All components of Universal Protocol/PAUSE Rule completed.
Crescentic Advancement Flap Text: The defect edges were debeveled with a #15 scalpel blade. Given the location of the defect and the proximity to free margins a crescentic advancement flap was deemed most appropriate. Using a sterile surgical marker, the appropriate advancement flap was drawn incorporating the defect and placing the expected incisions within the relaxed skin tension lines where possible. The area thus outlined was incised deep to adipose tissue with a #15 scalpel blade. The skin margins were undermined to an appropriate distance in all directions utilizing iris scissors. Following this, the designed flap was advanced and carried over into the primary defect and sutured into place.
Mart-1 - Negative Histology Text: MART-1 staining demonstrates a normal density and pattern of melanocytes along the dermal-epidermal junction. The surgical margins are negative for tumor cells.
Complex Repair And Graft Additional Text (Will Appearing After The Standard Complex Repair Text): The complex repair was not sufficient to completely close the primary defect. The remaining additional defect was repaired with the graft mentioned below.
Medical Necessity Statement: Based on my medical judgement, Mohs surgery is the most appropriate treatment for this cancer compared to other treatments.
A-T Advancement Flap Text: The defect edges were debeveled with a #15 scalpel blade. Given the location of the defect, shape of the defect and the proximity to free margins an A-T advancement flap was deemed most appropriate. Using a sterile surgical marker, an appropriate advancement flap was drawn incorporating the defect and placing the expected incisions within the relaxed skin tension lines where possible. The area thus outlined was incised deep to adipose tissue with a #15 scalpel blade. The skin margins were undermined to an appropriate distance in all directions utilizing iris scissors. Following this, the designed flap was advanced and carried over into the primary defect and sutured into place.
Vermilion Border Text: The closure involved the vermilion border.
Composite Graft Text: The defect edges were debeveled with a #15 scalpel blade. Given the location of the defect, shape of the defect, the proximity to free margins and the fact the defect was full thickness a composite graft was deemed most appropriate.  The defect was outline and then transferred to the donor site.  A full thickness graft was then excised from the donor site. The graft was then placed in the primary defect, oriented appropriately and then sutured into place.  The secondary defect was then repaired using a primary closure.
Dermal Closure: buried vertical mattress

## 2025-06-30 ENCOUNTER — RESULTS FOLLOW-UP (OUTPATIENT)
Dept: MEDICAL GROUP | Facility: PHYSICIAN GROUP | Age: 65
End: 2025-06-30

## 2025-07-08 ENCOUNTER — APPOINTMENT (OUTPATIENT)
Dept: URBAN - METROPOLITAN AREA CLINIC 22 | Facility: CLINIC | Age: 65
Setting detail: DERMATOLOGY
End: 2025-07-08

## 2025-07-08 DIAGNOSIS — Z48.817 ENCOUNTER FOR SURGICAL AFTERCARE FOLLOWING SURGERY ON THE SKIN AND SUBCUTANEOUS TISSUE: ICD-10-CM

## 2025-07-08 PROCEDURE — ? POST-OP WOUND EVALUATION

## 2025-07-08 ASSESSMENT — LOCATION ZONE DERM: LOCATION ZONE: FACE

## 2025-07-08 ASSESSMENT — LOCATION DETAILED DESCRIPTION DERM: LOCATION DETAILED: SUPERIOR MID FOREHEAD

## 2025-07-08 ASSESSMENT — LOCATION SIMPLE DESCRIPTION DERM: LOCATION SIMPLE: SUPERIOR FOREHEAD

## 2025-07-08 NOTE — PROCEDURE: POST-OP WOUND EVALUATION
Body Location Override (Optional): mid upper forehead
Detail Level: Detailed
Add 59851 Cpt? (Important Note: In 2017 The Use Of 87135 Is Being Tracked By Cms To Determine Future Global Period Reimbursement For Global Periods): yes
Quality 355: Unplanned Reoperation Within The 30 Day Postoperative Period: No return to the operating room for a surgical procedure, for complications of the principal operative procedure, within 30 days of the principal operative procedure
Quality 357: Surgical Site Infection (Ssi): No surgical site infection
Wound Evaluated By (Optional): Calvin Martinez MD
Wound Diameter In Cm(Optional): 0
Wound Crusting?: clean
Sutures?: intact
Wound Edema?: mild
Wound Color?: pink